# Patient Record
Sex: FEMALE | Race: WHITE | Employment: OTHER | URBAN - METROPOLITAN AREA
[De-identification: names, ages, dates, MRNs, and addresses within clinical notes are randomized per-mention and may not be internally consistent; named-entity substitution may affect disease eponyms.]

---

## 2017-02-13 ENCOUNTER — TELEPHONE (OUTPATIENT)
Dept: FAMILY MEDICINE CLINIC | Age: 77
End: 2017-02-13

## 2017-02-13 DIAGNOSIS — Z12.31 ENCOUNTER FOR SCREENING MAMMOGRAM FOR BREAST CANCER: Primary | ICD-10-CM

## 2017-02-13 RX ORDER — DILTIAZEM HYDROCHLORIDE 60 MG/1
TABLET, FILM COATED ORAL
Qty: 180 TABLET | Refills: 3 | Status: SHIPPED | OUTPATIENT
Start: 2017-02-13 | End: 2018-02-11 | Stop reason: SDUPTHER

## 2017-02-15 ENCOUNTER — HOSPITAL ENCOUNTER (OUTPATIENT)
Dept: WOMENS IMAGING | Age: 77
Discharge: HOME | End: 2017-02-15
Payer: MEDICARE

## 2017-02-15 DIAGNOSIS — Z12.31 ENCOUNTER FOR SCREENING MAMMOGRAM FOR BREAST CANCER: ICD-10-CM

## 2017-02-15 PROCEDURE — G0202 SCR MAMMO BI INCL CAD: HCPCS

## 2017-03-27 DIAGNOSIS — E78.00 PURE HYPERCHOLESTEROLEMIA: ICD-10-CM

## 2017-03-27 DIAGNOSIS — I10 ESSENTIAL HYPERTENSION: ICD-10-CM

## 2017-03-27 DIAGNOSIS — E11.21 TYPE 2 DIABETES MELLITUS WITH DIABETIC NEPHROPATHY, WITHOUT LONG-TERM CURRENT USE OF INSULIN (HCC): ICD-10-CM

## 2017-03-27 LAB
ALBUMIN SERPL-MCNC: 4.3 G/DL (ref 3.9–4.9)
ALP BLD-CCNC: 67 U/L (ref 40–130)
ALT SERPL-CCNC: 20 U/L (ref 0–33)
ANION GAP SERPL CALCULATED.3IONS-SCNC: 12 MEQ/L (ref 7–13)
AST SERPL-CCNC: 18 U/L (ref 0–35)
BASOPHILS ABSOLUTE: 0.1 K/UL (ref 0–0.2)
BASOPHILS RELATIVE PERCENT: 1.2 %
BILIRUB SERPL-MCNC: 0.5 MG/DL (ref 0–1.2)
BUN BLDV-MCNC: 21 MG/DL (ref 8–23)
CALCIUM SERPL-MCNC: 10.1 MG/DL (ref 8.6–10.2)
CHLORIDE BLD-SCNC: 97 MEQ/L (ref 98–107)
CHOLESTEROL, TOTAL: 123 MG/DL (ref 0–199)
CO2: 23 MEQ/L (ref 22–29)
CREAT SERPL-MCNC: 1.16 MG/DL (ref 0.5–0.9)
CREATININE URINE: 78.1 MG/DL
EOSINOPHILS ABSOLUTE: 0.2 K/UL (ref 0–0.7)
EOSINOPHILS RELATIVE PERCENT: 3.1 %
GFR AFRICAN AMERICAN: 55.1
GFR NON-AFRICAN AMERICAN: 45.5
GLOBULIN: 2.4 G/DL (ref 2.3–3.5)
GLUCOSE BLD-MCNC: 117 MG/DL (ref 74–109)
HBA1C MFR BLD: 6 % (ref 4.8–5.9)
HCT VFR BLD CALC: 30.5 % (ref 37–47)
HDLC SERPL-MCNC: 59 MG/DL (ref 40–59)
HEMOGLOBIN: 10.3 G/DL (ref 12–16)
LDL CHOLESTEROL CALCULATED: 40 MG/DL (ref 0–129)
LYMPHOCYTES ABSOLUTE: 1.4 K/UL (ref 1–4.8)
LYMPHOCYTES RELATIVE PERCENT: 17.2 %
MCH RBC QN AUTO: 29.4 PG (ref 27–31.3)
MCHC RBC AUTO-ENTMCNC: 33.8 % (ref 33–37)
MCV RBC AUTO: 86.9 FL (ref 82–100)
MICROALBUMIN UR-MCNC: 2.9 MG/DL
MICROALBUMIN/CREAT UR-RTO: 37.1 MG/G (ref 0–30)
MONOCYTES ABSOLUTE: 0.7 K/UL (ref 0.2–0.8)
MONOCYTES RELATIVE PERCENT: 9.3 %
NEUTROPHILS ABSOLUTE: 5.4 K/UL (ref 1.4–6.5)
NEUTROPHILS RELATIVE PERCENT: 69.2 %
PDW BLD-RTO: 14.7 % (ref 11.5–14.5)
PLATELET # BLD: 354 K/UL (ref 130–400)
POTASSIUM SERPL-SCNC: 4.8 MEQ/L (ref 3.5–5.1)
RBC # BLD: 3.51 M/UL (ref 4.2–5.4)
SODIUM BLD-SCNC: 132 MEQ/L (ref 132–144)
TOTAL PROTEIN: 6.7 G/DL (ref 6.4–8.1)
TRIGL SERPL-MCNC: 122 MG/DL (ref 0–200)
WBC # BLD: 7.9 K/UL (ref 4.8–10.8)

## 2017-04-03 ENCOUNTER — OFFICE VISIT (OUTPATIENT)
Dept: FAMILY MEDICINE CLINIC | Age: 77
End: 2017-04-03

## 2017-04-03 VITALS
SYSTOLIC BLOOD PRESSURE: 150 MMHG | RESPIRATION RATE: 12 BRPM | DIASTOLIC BLOOD PRESSURE: 60 MMHG | TEMPERATURE: 96.3 F | HEART RATE: 66 BPM | BODY MASS INDEX: 28.03 KG/M2 | HEIGHT: 64 IN | WEIGHT: 164.2 LBS

## 2017-04-03 DIAGNOSIS — I25.10 CORONARY ARTERY DISEASE INVOLVING NATIVE CORONARY ARTERY OF NATIVE HEART WITHOUT ANGINA PECTORIS: ICD-10-CM

## 2017-04-03 DIAGNOSIS — Z12.11 COLON CANCER SCREENING: ICD-10-CM

## 2017-04-03 DIAGNOSIS — I70.1 ATHEROSCLEROTIC RAS (RENAL ARTERY STENOSIS), BILATERAL (HCC): ICD-10-CM

## 2017-04-03 DIAGNOSIS — M54.50 CHRONIC BILATERAL LOW BACK PAIN WITHOUT SCIATICA: ICD-10-CM

## 2017-04-03 DIAGNOSIS — E11.21 TYPE 2 DIABETES MELLITUS WITH DIABETIC NEPHROPATHY, WITHOUT LONG-TERM CURRENT USE OF INSULIN (HCC): ICD-10-CM

## 2017-04-03 DIAGNOSIS — I10 ESSENTIAL HYPERTENSION: ICD-10-CM

## 2017-04-03 DIAGNOSIS — N28.9 RENAL INSUFFICIENCY: ICD-10-CM

## 2017-04-03 DIAGNOSIS — E11.21 TYPE 2 DIABETES MELLITUS WITH DIABETIC NEPHROPATHY, WITHOUT LONG-TERM CURRENT USE OF INSULIN (HCC): Primary | ICD-10-CM

## 2017-04-03 DIAGNOSIS — K63.5 COLON POLYPS: ICD-10-CM

## 2017-04-03 DIAGNOSIS — R35.0 URINARY FREQUENCY: ICD-10-CM

## 2017-04-03 DIAGNOSIS — G89.29 CHRONIC BILATERAL LOW BACK PAIN WITHOUT SCIATICA: ICD-10-CM

## 2017-04-03 DIAGNOSIS — E78.00 PURE HYPERCHOLESTEROLEMIA: ICD-10-CM

## 2017-04-03 LAB
ANION GAP SERPL CALCULATED.3IONS-SCNC: 12 MEQ/L (ref 7–13)
BUN BLDV-MCNC: 18 MG/DL (ref 8–23)
CALCIUM SERPL-MCNC: 9.7 MG/DL (ref 8.6–10.2)
CHLORIDE BLD-SCNC: 97 MEQ/L (ref 98–107)
CO2: 23 MEQ/L (ref 22–29)
CREAT SERPL-MCNC: 1.16 MG/DL (ref 0.5–0.9)
GFR AFRICAN AMERICAN: 55.1
GFR NON-AFRICAN AMERICAN: 45.5
GLUCOSE BLD-MCNC: 123 MG/DL (ref 74–109)
HBA1C MFR BLD: 6.1 % (ref 4.8–5.9)
POTASSIUM SERPL-SCNC: 4.4 MEQ/L (ref 3.5–5.1)
SODIUM BLD-SCNC: 132 MEQ/L (ref 132–144)

## 2017-04-03 PROCEDURE — G8427 DOCREV CUR MEDS BY ELIG CLIN: HCPCS | Performed by: FAMILY MEDICINE

## 2017-04-03 PROCEDURE — 1090F PRES/ABSN URINE INCON ASSESS: CPT | Performed by: FAMILY MEDICINE

## 2017-04-03 PROCEDURE — 4040F PNEUMOC VAC/ADMIN/RCVD: CPT | Performed by: FAMILY MEDICINE

## 2017-04-03 PROCEDURE — G8399 PT W/DXA RESULTS DOCUMENT: HCPCS | Performed by: FAMILY MEDICINE

## 2017-04-03 PROCEDURE — 3017F COLORECTAL CA SCREEN DOC REV: CPT | Performed by: FAMILY MEDICINE

## 2017-04-03 PROCEDURE — 1123F ACP DISCUSS/DSCN MKR DOCD: CPT | Performed by: FAMILY MEDICINE

## 2017-04-03 PROCEDURE — G8420 CALC BMI NORM PARAMETERS: HCPCS | Performed by: FAMILY MEDICINE

## 2017-04-03 PROCEDURE — 99214 OFFICE O/P EST MOD 30 MIN: CPT | Performed by: FAMILY MEDICINE

## 2017-04-03 PROCEDURE — G8598 ASA/ANTIPLAT THER USED: HCPCS | Performed by: FAMILY MEDICINE

## 2017-04-03 PROCEDURE — 1036F TOBACCO NON-USER: CPT | Performed by: FAMILY MEDICINE

## 2017-04-03 PROCEDURE — 3044F HG A1C LEVEL LT 7.0%: CPT | Performed by: FAMILY MEDICINE

## 2017-04-03 RX ORDER — HYDROCODONE BITARTRATE AND ACETAMINOPHEN 5; 325 MG/1; MG/1
TABLET ORAL
Qty: 120 TABLET | Refills: 0 | Status: SHIPPED | OUTPATIENT
Start: 2017-04-03 | End: 2017-07-11 | Stop reason: SDUPTHER

## 2017-04-03 ASSESSMENT — PATIENT HEALTH QUESTIONNAIRE - PHQ9
2. FEELING DOWN, DEPRESSED OR HOPELESS: 0
SUM OF ALL RESPONSES TO PHQ QUESTIONS 1-9: 0
SUM OF ALL RESPONSES TO PHQ9 QUESTIONS 1 & 2: 0
1. LITTLE INTEREST OR PLEASURE IN DOING THINGS: 0

## 2017-04-04 ENCOUNTER — TELEPHONE (OUTPATIENT)
Dept: FAMILY MEDICINE CLINIC | Age: 77
End: 2017-04-04

## 2017-04-14 ENCOUNTER — ANESTHESIA (OUTPATIENT)
Dept: ENDOSCOPY | Age: 77
End: 2017-04-14
Payer: MEDICARE

## 2017-04-14 ENCOUNTER — ANESTHESIA EVENT (OUTPATIENT)
Dept: ENDOSCOPY | Age: 77
End: 2017-04-14
Payer: MEDICARE

## 2017-04-14 ENCOUNTER — HOSPITAL ENCOUNTER (OUTPATIENT)
Age: 77
Setting detail: OUTPATIENT SURGERY
Discharge: HOME | End: 2017-04-14
Attending: SPECIALIST | Admitting: SPECIALIST
Payer: MEDICARE

## 2017-04-14 VITALS
HEIGHT: 64 IN | WEIGHT: 164 LBS | TEMPERATURE: 97.5 F | RESPIRATION RATE: 16 BRPM | BODY MASS INDEX: 28 KG/M2 | HEART RATE: 53 BPM | OXYGEN SATURATION: 98 % | DIASTOLIC BLOOD PRESSURE: 68 MMHG | SYSTOLIC BLOOD PRESSURE: 162 MMHG

## 2017-04-14 VITALS
RESPIRATION RATE: 19 BRPM | DIASTOLIC BLOOD PRESSURE: 65 MMHG | OXYGEN SATURATION: 100 % | SYSTOLIC BLOOD PRESSURE: 150 MMHG

## 2017-04-14 PROCEDURE — 3700000001 HC ADD 15 MINUTES (ANESTHESIA): Performed by: SPECIALIST

## 2017-04-14 PROCEDURE — 3700000000 HC ANESTHESIA ATTENDED CARE: Performed by: SPECIALIST

## 2017-04-14 PROCEDURE — 7100000011 HC PHASE II RECOVERY - ADDTL 15 MIN: Performed by: SPECIALIST

## 2017-04-14 PROCEDURE — 2580000003 HC RX 258: Performed by: STUDENT IN AN ORGANIZED HEALTH CARE EDUCATION/TRAINING PROGRAM

## 2017-04-14 PROCEDURE — 2500000003 HC RX 250 WO HCPCS: Performed by: STUDENT IN AN ORGANIZED HEALTH CARE EDUCATION/TRAINING PROGRAM

## 2017-04-14 PROCEDURE — 88305 TISSUE EXAM BY PATHOLOGIST: CPT

## 2017-04-14 PROCEDURE — 3609027000 HC COLONOSCOPY: Performed by: SPECIALIST

## 2017-04-14 PROCEDURE — 7100000010 HC PHASE II RECOVERY - FIRST 15 MIN: Performed by: SPECIALIST

## 2017-04-14 PROCEDURE — 6360000002 HC RX W HCPCS

## 2017-04-14 RX ORDER — SODIUM CHLORIDE 0.9 % (FLUSH) 0.9 %
10 SYRINGE (ML) INJECTION EVERY 12 HOURS SCHEDULED
Status: DISCONTINUED | OUTPATIENT
Start: 2017-04-14 | End: 2017-04-14 | Stop reason: HOSPADM

## 2017-04-14 RX ORDER — PROPOFOL 10 MG/ML
INJECTION, EMULSION INTRAVENOUS CONTINUOUS PRN
Status: DISCONTINUED | OUTPATIENT
Start: 2017-04-14 | End: 2017-04-14 | Stop reason: SDUPTHER

## 2017-04-14 RX ORDER — SODIUM CHLORIDE 9 MG/ML
INJECTION, SOLUTION INTRAVENOUS CONTINUOUS
Status: DISCONTINUED | OUTPATIENT
Start: 2017-04-14 | End: 2017-04-14 | Stop reason: HOSPADM

## 2017-04-14 RX ORDER — LIDOCAINE HYDROCHLORIDE 10 MG/ML
1 INJECTION, SOLUTION EPIDURAL; INFILTRATION; INTRACAUDAL; PERINEURAL
Status: COMPLETED | OUTPATIENT
Start: 2017-04-14 | End: 2017-04-14

## 2017-04-14 RX ORDER — SODIUM CHLORIDE 0.9 % (FLUSH) 0.9 %
10 SYRINGE (ML) INJECTION PRN
Status: DISCONTINUED | OUTPATIENT
Start: 2017-04-14 | End: 2017-04-14 | Stop reason: HOSPADM

## 2017-04-14 RX ADMIN — SODIUM CHLORIDE: 9 INJECTION, SOLUTION INTRAVENOUS at 10:40

## 2017-04-14 RX ADMIN — LIDOCAINE HYDROCHLORIDE 30 MG: 10 INJECTION, SOLUTION EPIDURAL; INFILTRATION; INTRACAUDAL; PERINEURAL at 10:45

## 2017-04-14 RX ADMIN — PROPOFOL 100 MCG/KG/MIN: 10 INJECTION, EMULSION INTRAVENOUS at 10:45

## 2017-04-14 ASSESSMENT — PAIN - FUNCTIONAL ASSESSMENT: PAIN_FUNCTIONAL_ASSESSMENT: 0-10

## 2017-04-20 RX ORDER — RAMIPRIL 10 MG/1
CAPSULE ORAL
Qty: 180 CAPSULE | Refills: 3 | Status: SHIPPED | OUTPATIENT
Start: 2017-04-20 | End: 2018-04-17 | Stop reason: SDUPTHER

## 2017-07-06 DIAGNOSIS — E11.21 TYPE 2 DIABETES MELLITUS WITH DIABETIC NEPHROPATHY, WITHOUT LONG-TERM CURRENT USE OF INSULIN (HCC): Primary | ICD-10-CM

## 2017-07-06 LAB — HBA1C MFR BLD: 6.3 % (ref 4.8–5.9)

## 2017-07-11 ENCOUNTER — OFFICE VISIT (OUTPATIENT)
Dept: FAMILY MEDICINE CLINIC | Age: 77
End: 2017-07-11

## 2017-07-11 VITALS
BODY MASS INDEX: 27.39 KG/M2 | HEART RATE: 66 BPM | RESPIRATION RATE: 12 BRPM | DIASTOLIC BLOOD PRESSURE: 80 MMHG | WEIGHT: 160.4 LBS | SYSTOLIC BLOOD PRESSURE: 110 MMHG | TEMPERATURE: 96.4 F | HEIGHT: 64 IN

## 2017-07-11 DIAGNOSIS — I10 ESSENTIAL HYPERTENSION: ICD-10-CM

## 2017-07-11 DIAGNOSIS — E11.21 TYPE 2 DIABETES MELLITUS WITH DIABETIC NEPHROPATHY, WITHOUT LONG-TERM CURRENT USE OF INSULIN (HCC): ICD-10-CM

## 2017-07-11 DIAGNOSIS — I25.10 CORONARY ARTERY DISEASE INVOLVING NATIVE CORONARY ARTERY OF NATIVE HEART WITHOUT ANGINA PECTORIS: ICD-10-CM

## 2017-07-11 DIAGNOSIS — E11.21 DIABETIC NEPHROPATHY ASSOCIATED WITH TYPE 2 DIABETES MELLITUS (HCC): ICD-10-CM

## 2017-07-11 DIAGNOSIS — E78.00 PURE HYPERCHOLESTEROLEMIA: ICD-10-CM

## 2017-07-11 PROCEDURE — 4040F PNEUMOC VAC/ADMIN/RCVD: CPT | Performed by: FAMILY MEDICINE

## 2017-07-11 PROCEDURE — 3017F COLORECTAL CA SCREEN DOC REV: CPT | Performed by: FAMILY MEDICINE

## 2017-07-11 PROCEDURE — 3046F HEMOGLOBIN A1C LEVEL >9.0%: CPT | Performed by: FAMILY MEDICINE

## 2017-07-11 PROCEDURE — G8427 DOCREV CUR MEDS BY ELIG CLIN: HCPCS | Performed by: FAMILY MEDICINE

## 2017-07-11 PROCEDURE — 1090F PRES/ABSN URINE INCON ASSESS: CPT | Performed by: FAMILY MEDICINE

## 2017-07-11 PROCEDURE — 1036F TOBACCO NON-USER: CPT | Performed by: FAMILY MEDICINE

## 2017-07-11 PROCEDURE — G8598 ASA/ANTIPLAT THER USED: HCPCS | Performed by: FAMILY MEDICINE

## 2017-07-11 PROCEDURE — 1123F ACP DISCUSS/DSCN MKR DOCD: CPT | Performed by: FAMILY MEDICINE

## 2017-07-11 PROCEDURE — 99214 OFFICE O/P EST MOD 30 MIN: CPT | Performed by: FAMILY MEDICINE

## 2017-07-11 PROCEDURE — G8419 CALC BMI OUT NRM PARAM NOF/U: HCPCS | Performed by: FAMILY MEDICINE

## 2017-07-11 PROCEDURE — G8399 PT W/DXA RESULTS DOCUMENT: HCPCS | Performed by: FAMILY MEDICINE

## 2017-07-11 RX ORDER — HYDROCODONE BITARTRATE AND ACETAMINOPHEN 5; 325 MG/1; MG/1
TABLET ORAL
Qty: 120 TABLET | Refills: 0 | Status: SHIPPED | OUTPATIENT
Start: 2017-07-11 | End: 2017-10-12 | Stop reason: SDUPTHER

## 2017-08-15 ENCOUNTER — HOSPITAL ENCOUNTER (OUTPATIENT)
Dept: ULTRASOUND IMAGING | Age: 77
Discharge: HOME | End: 2017-08-15
Payer: MEDICARE

## 2017-08-15 ENCOUNTER — OFFICE VISIT (OUTPATIENT)
Dept: FAMILY MEDICINE CLINIC | Age: 77
End: 2017-08-15

## 2017-08-15 VITALS
DIASTOLIC BLOOD PRESSURE: 72 MMHG | BODY MASS INDEX: 28.7 KG/M2 | WEIGHT: 168.1 LBS | RESPIRATION RATE: 25 BRPM | TEMPERATURE: 97.5 F | HEART RATE: 64 BPM | SYSTOLIC BLOOD PRESSURE: 126 MMHG | HEIGHT: 64 IN | OXYGEN SATURATION: 99 %

## 2017-08-15 DIAGNOSIS — R35.0 URINARY FREQUENCY: ICD-10-CM

## 2017-08-15 DIAGNOSIS — R39.11 URINARY HESITANCY: Primary | ICD-10-CM

## 2017-08-15 PROCEDURE — 1036F TOBACCO NON-USER: CPT | Performed by: NURSE PRACTITIONER

## 2017-08-15 PROCEDURE — 3017F COLORECTAL CA SCREEN DOC REV: CPT | Performed by: NURSE PRACTITIONER

## 2017-08-15 PROCEDURE — 1123F ACP DISCUSS/DSCN MKR DOCD: CPT | Performed by: NURSE PRACTITIONER

## 2017-08-15 PROCEDURE — G8598 ASA/ANTIPLAT THER USED: HCPCS | Performed by: NURSE PRACTITIONER

## 2017-08-15 PROCEDURE — 1090F PRES/ABSN URINE INCON ASSESS: CPT | Performed by: NURSE PRACTITIONER

## 2017-08-15 PROCEDURE — 99213 OFFICE O/P EST LOW 20 MIN: CPT | Performed by: NURSE PRACTITIONER

## 2017-08-15 PROCEDURE — 4040F PNEUMOC VAC/ADMIN/RCVD: CPT | Performed by: NURSE PRACTITIONER

## 2017-08-15 PROCEDURE — 76775 US EXAM ABDO BACK WALL LIM: CPT

## 2017-08-15 PROCEDURE — G8399 PT W/DXA RESULTS DOCUMENT: HCPCS | Performed by: NURSE PRACTITIONER

## 2017-08-15 PROCEDURE — 76770 US EXAM ABDO BACK WALL COMP: CPT

## 2017-08-15 PROCEDURE — G8419 CALC BMI OUT NRM PARAM NOF/U: HCPCS | Performed by: NURSE PRACTITIONER

## 2017-08-15 PROCEDURE — G8427 DOCREV CUR MEDS BY ELIG CLIN: HCPCS | Performed by: NURSE PRACTITIONER

## 2017-08-16 DIAGNOSIS — R39.11 URINARY HESITANCY: ICD-10-CM

## 2017-08-16 LAB
BACTERIA: ABNORMAL /HPF
BILIRUBIN URINE: NEGATIVE
BLOOD, URINE: ABNORMAL
CLARITY: ABNORMAL
COLOR: YELLOW
GLUCOSE URINE: NEGATIVE MG/DL
KETONES, URINE: NEGATIVE MG/DL
LEUKOCYTE ESTERASE, URINE: ABNORMAL
NITRITE, URINE: NEGATIVE
PH UA: 6.5 (ref 5–9)
PROTEIN UA: 100 MG/DL
RBC UA: ABNORMAL /HPF (ref 0–2)
SPECIFIC GRAVITY UA: 1.01 (ref 1–1.03)
URINE REFLEX TO CULTURE: YES
UROBILINOGEN, URINE: 0.2 E.U./DL
WBC UA: ABNORMAL /HPF (ref 0–5)

## 2017-08-18 DIAGNOSIS — N30.01 ACUTE CYSTITIS WITH HEMATURIA: Primary | ICD-10-CM

## 2017-08-18 LAB — URINE CULTURE, ROUTINE: NORMAL

## 2017-08-18 RX ORDER — CIPROFLOXACIN 500 MG/1
500 TABLET, FILM COATED ORAL 2 TIMES DAILY
Qty: 6 TABLET | Refills: 0 | Status: SHIPPED | OUTPATIENT
Start: 2017-08-18 | End: 2017-08-21

## 2017-10-05 DIAGNOSIS — I10 ESSENTIAL HYPERTENSION: ICD-10-CM

## 2017-10-05 DIAGNOSIS — E11.21 TYPE 2 DIABETES MELLITUS WITH DIABETIC NEPHROPATHY, WITHOUT LONG-TERM CURRENT USE OF INSULIN (HCC): ICD-10-CM

## 2017-10-05 LAB
ALBUMIN SERPL-MCNC: 4.5 G/DL (ref 3.9–4.9)
ALP BLD-CCNC: 42 U/L (ref 40–130)
ALT SERPL-CCNC: 15 U/L (ref 0–33)
ANION GAP SERPL CALCULATED.3IONS-SCNC: 16 MEQ/L (ref 7–13)
AST SERPL-CCNC: 22 U/L (ref 0–35)
BILIRUB SERPL-MCNC: 0.4 MG/DL (ref 0–1.2)
BUN BLDV-MCNC: 20 MG/DL (ref 8–23)
CALCIUM SERPL-MCNC: 9.3 MG/DL (ref 8.6–10.2)
CHLORIDE BLD-SCNC: 93 MEQ/L (ref 98–107)
CO2: 24 MEQ/L (ref 22–29)
CREAT SERPL-MCNC: 1.15 MG/DL (ref 0.5–0.9)
GFR AFRICAN AMERICAN: 55.6
GFR NON-AFRICAN AMERICAN: 45.9
GLOBULIN: 2.4 G/DL (ref 2.3–3.5)
GLUCOSE BLD-MCNC: 113 MG/DL (ref 74–109)
HBA1C MFR BLD: 6.1 % (ref 4.8–5.9)
POTASSIUM SERPL-SCNC: 4.8 MEQ/L (ref 3.5–5.1)
SODIUM BLD-SCNC: 133 MEQ/L (ref 132–144)
TOTAL PROTEIN: 6.9 G/DL (ref 6.4–8.1)

## 2017-10-12 ENCOUNTER — OFFICE VISIT (OUTPATIENT)
Dept: FAMILY MEDICINE CLINIC | Age: 77
End: 2017-10-12

## 2017-10-12 VITALS
HEART RATE: 56 BPM | OXYGEN SATURATION: 98 % | WEIGHT: 165.2 LBS | TEMPERATURE: 96.9 F | SYSTOLIC BLOOD PRESSURE: 130 MMHG | DIASTOLIC BLOOD PRESSURE: 52 MMHG | HEIGHT: 64 IN | BODY MASS INDEX: 28.2 KG/M2

## 2017-10-12 DIAGNOSIS — E11.21 TYPE 2 DIABETES MELLITUS WITH DIABETIC NEPHROPATHY, WITHOUT LONG-TERM CURRENT USE OF INSULIN (HCC): Primary | ICD-10-CM

## 2017-10-12 DIAGNOSIS — I25.10 CORONARY ARTERY DISEASE INVOLVING NATIVE CORONARY ARTERY OF NATIVE HEART WITHOUT ANGINA PECTORIS: ICD-10-CM

## 2017-10-12 DIAGNOSIS — E78.00 PURE HYPERCHOLESTEROLEMIA: ICD-10-CM

## 2017-10-12 DIAGNOSIS — E11.21 DIABETIC NEPHROPATHY ASSOCIATED WITH TYPE 2 DIABETES MELLITUS (HCC): ICD-10-CM

## 2017-10-12 DIAGNOSIS — Z23 NEEDS FLU SHOT: ICD-10-CM

## 2017-10-12 DIAGNOSIS — I10 ESSENTIAL HYPERTENSION: ICD-10-CM

## 2017-10-12 PROCEDURE — 99214 OFFICE O/P EST MOD 30 MIN: CPT | Performed by: FAMILY MEDICINE

## 2017-10-12 PROCEDURE — G8417 CALC BMI ABV UP PARAM F/U: HCPCS | Performed by: FAMILY MEDICINE

## 2017-10-12 PROCEDURE — 3046F HEMOGLOBIN A1C LEVEL >9.0%: CPT | Performed by: FAMILY MEDICINE

## 2017-10-12 PROCEDURE — 1123F ACP DISCUSS/DSCN MKR DOCD: CPT | Performed by: FAMILY MEDICINE

## 2017-10-12 PROCEDURE — 90662 IIV NO PRSV INCREASED AG IM: CPT | Performed by: FAMILY MEDICINE

## 2017-10-12 PROCEDURE — 1036F TOBACCO NON-USER: CPT | Performed by: FAMILY MEDICINE

## 2017-10-12 PROCEDURE — 1090F PRES/ABSN URINE INCON ASSESS: CPT | Performed by: FAMILY MEDICINE

## 2017-10-12 PROCEDURE — G8484 FLU IMMUNIZE NO ADMIN: HCPCS | Performed by: FAMILY MEDICINE

## 2017-10-12 PROCEDURE — 3017F COLORECTAL CA SCREEN DOC REV: CPT | Performed by: FAMILY MEDICINE

## 2017-10-12 PROCEDURE — G8427 DOCREV CUR MEDS BY ELIG CLIN: HCPCS | Performed by: FAMILY MEDICINE

## 2017-10-12 PROCEDURE — 4040F PNEUMOC VAC/ADMIN/RCVD: CPT | Performed by: FAMILY MEDICINE

## 2017-10-12 PROCEDURE — G8598 ASA/ANTIPLAT THER USED: HCPCS | Performed by: FAMILY MEDICINE

## 2017-10-12 PROCEDURE — G0008 ADMIN INFLUENZA VIRUS VAC: HCPCS | Performed by: FAMILY MEDICINE

## 2017-10-12 PROCEDURE — G8399 PT W/DXA RESULTS DOCUMENT: HCPCS | Performed by: FAMILY MEDICINE

## 2017-10-12 RX ORDER — HYDROCODONE BITARTRATE AND ACETAMINOPHEN 5; 325 MG/1; MG/1
TABLET ORAL
Qty: 120 TABLET | Refills: 0 | Status: SHIPPED | OUTPATIENT
Start: 2017-10-12 | End: 2018-04-12 | Stop reason: SDUPTHER

## 2017-10-12 NOTE — PROGRESS NOTES
with diabetic nephropathy, without long-term current use of insulin ([facility] [address]     2. Essential hypertension     3. Pure hypercholesterolemia     4. Coronary artery disease involving native coronary artery of native heart without angina pectoris     5. Diabetic nephropathy associated with type 2 diabetes mellitus ([facility] [address]     6. Needs flu shot  INFLUENZA, HIGH DOSE, 65 YRS +, IM, PF, PREFILL SYR, 0.5ML (FLUZONE HD)       PLAN:  Orders Placed This Encounter   Procedures    INFLUENZA, HIGH DOSE, 65 YRS +, IM, PF, PREFILL SYR, 0.5ML (FLUZONE HD)     Orders Placed This Encounter   Medications    HYDROcodone-acetaminophen (NORCO) 5-325 MG per tablet     Sig: TAKE ONE TABLET EVERY 4-6 HOURS AS NEEDED. Dispense:  120 tablet     Refill:  0       Controlled Substances Monitoring:   Attestation: The Prescription Monitoring Report for this patient was reviewed today. Nino Houston MD)  Documentation: No signs of potential drug abuse or diversion identified. Nino Houston MD)  Follow-up in 3 months or as needed. Patient will need labs prior to their next visit. Follow up with Dr. Sharlon Bamberger as scheduled.

## 2018-02-11 NOTE — TELEPHONE ENCOUNTER
Pharmacy requesting refill    Medication pended. Last Ov: 10/12/17  Last Rx: 11/19/17    Please approve or deny.     Future Appointments  Date Time Provider Figueroa Campuzano   4/5/2018 9:15 AM SCHEDULE, LAB VINAY Leon PCP [facility]   4/12/2018 10:15 AM Dheeraj Guerrero MD [address] [facility]

## 2018-02-12 RX ORDER — DILTIAZEM HYDROCHLORIDE 60 MG/1
TABLET, FILM COATED ORAL
Qty: 180 TABLET | Refills: 3 | Status: SHIPPED | OUTPATIENT
Start: 2018-02-12 | End: 2019-02-18 | Stop reason: SDUPTHER

## 2018-03-21 DIAGNOSIS — Z12.31 ENCOUNTER FOR SCREENING MAMMOGRAM FOR BREAST CANCER: Primary | ICD-10-CM

## 2018-03-24 ENCOUNTER — HOSPITAL ENCOUNTER (OUTPATIENT)
Dept: WOMENS IMAGING | Age: 78
Discharge: HOME | End: 2018-03-26
Payer: MEDICARE

## 2018-03-24 DIAGNOSIS — Z12.31 ENCOUNTER FOR SCREENING MAMMOGRAM FOR BREAST CANCER: ICD-10-CM

## 2018-03-24 PROCEDURE — 77067 SCR MAMMO BI INCL CAD: CPT

## 2018-04-05 DIAGNOSIS — E78.00 PURE HYPERCHOLESTEROLEMIA: ICD-10-CM

## 2018-04-05 DIAGNOSIS — E11.21 TYPE 2 DIABETES MELLITUS WITH DIABETIC NEPHROPATHY, WITHOUT LONG-TERM CURRENT USE OF INSULIN (HCC): ICD-10-CM

## 2018-04-05 DIAGNOSIS — I10 ESSENTIAL HYPERTENSION: ICD-10-CM

## 2018-04-05 LAB
ALBUMIN SERPL-MCNC: 4.3 G/DL (ref 3.9–4.9)
ALP BLD-CCNC: 46 U/L (ref 40–130)
ALT SERPL-CCNC: 20 U/L (ref 0–33)
ANION GAP SERPL CALCULATED.3IONS-SCNC: 17 MEQ/L (ref 7–13)
AST SERPL-CCNC: 20 U/L (ref 0–35)
BASOPHILS ABSOLUTE: 0.1 K/UL (ref 0–0.2)
BASOPHILS RELATIVE PERCENT: 1.2 %
BILIRUB SERPL-MCNC: 0.3 MG/DL (ref 0–1.2)
BUN BLDV-MCNC: 13 MG/DL (ref 8–23)
CALCIUM SERPL-MCNC: 9.6 MG/DL (ref 8.6–10.2)
CHLORIDE BLD-SCNC: 99 MEQ/L (ref 98–107)
CHOLESTEROL, TOTAL: 106 MG/DL (ref 0–199)
CO2: 23 MEQ/L (ref 22–29)
CREAT SERPL-MCNC: 1.27 MG/DL (ref 0.5–0.9)
EOSINOPHILS ABSOLUTE: 0.3 K/UL (ref 0–0.7)
EOSINOPHILS RELATIVE PERCENT: 3.5 %
GFR AFRICAN AMERICAN: 49.5
GFR NON-AFRICAN AMERICAN: 40.9
GLOBULIN: 2.1 G/DL (ref 2.3–3.5)
GLUCOSE BLD-MCNC: 110 MG/DL (ref 74–109)
HBA1C MFR BLD: 6.4 % (ref 4.8–5.9)
HCT VFR BLD CALC: 26.5 % (ref 37–47)
HDLC SERPL-MCNC: 48 MG/DL (ref 40–59)
HEMOGLOBIN: 8.8 G/DL (ref 12–16)
LDL CHOLESTEROL CALCULATED: 36 MG/DL (ref 0–129)
LYMPHOCYTES ABSOLUTE: 1.5 K/UL (ref 1–4.8)
LYMPHOCYTES RELATIVE PERCENT: 21 %
MCH RBC QN AUTO: 27.7 PG (ref 27–31.3)
MCHC RBC AUTO-ENTMCNC: 33.3 % (ref 33–37)
MCV RBC AUTO: 83.2 FL (ref 82–100)
MONOCYTES ABSOLUTE: 0.7 K/UL (ref 0.2–0.8)
MONOCYTES RELATIVE PERCENT: 9.1 %
NEUTROPHILS ABSOLUTE: 4.7 K/UL (ref 1.4–6.5)
NEUTROPHILS RELATIVE PERCENT: 65.2 %
PDW BLD-RTO: 15.3 % (ref 11.5–14.5)
PLATELET # BLD: 365 K/UL (ref 130–400)
POTASSIUM SERPL-SCNC: 5 MEQ/L (ref 3.5–5.1)
RBC # BLD: 3.18 M/UL (ref 4.2–5.4)
SODIUM BLD-SCNC: 139 MEQ/L (ref 132–144)
TOTAL PROTEIN: 6.4 G/DL (ref 6.4–8.1)
TRIGL SERPL-MCNC: 111 MG/DL (ref 0–200)
WBC # BLD: 7.3 K/UL (ref 4.8–10.8)

## 2018-04-06 LAB
CREATININE URINE: 94 MG/DL
MICROALBUMIN UR-MCNC: 10.6 MG/DL
MICROALBUMIN/CREAT UR-RTO: 112.8 MG/G (ref 0–30)

## 2018-04-12 ENCOUNTER — OFFICE VISIT (OUTPATIENT)
Dept: FAMILY MEDICINE CLINIC | Age: 78
End: 2018-04-12
Payer: MEDICARE

## 2018-04-12 VITALS
BODY MASS INDEX: 28.48 KG/M2 | HEIGHT: 64 IN | DIASTOLIC BLOOD PRESSURE: 52 MMHG | HEART RATE: 57 BPM | SYSTOLIC BLOOD PRESSURE: 124 MMHG | OXYGEN SATURATION: 99 % | TEMPERATURE: 97.5 F | WEIGHT: 166.8 LBS

## 2018-04-12 DIAGNOSIS — I10 ESSENTIAL HYPERTENSION: ICD-10-CM

## 2018-04-12 DIAGNOSIS — N18.30 CONTROLLED TYPE 2 DIABETES MELLITUS WITH STAGE 3 CHRONIC KIDNEY DISEASE, WITHOUT LONG-TERM CURRENT USE OF INSULIN (HCC): Primary | ICD-10-CM

## 2018-04-12 DIAGNOSIS — D50.9 MICROCYTIC ANEMIA: ICD-10-CM

## 2018-04-12 DIAGNOSIS — E11.21 TYPE 2 DIABETES MELLITUS WITH DIABETIC NEPHROPATHY, WITHOUT LONG-TERM CURRENT USE OF INSULIN (HCC): ICD-10-CM

## 2018-04-12 DIAGNOSIS — M17.0 PRIMARY OSTEOARTHRITIS OF BOTH KNEES: ICD-10-CM

## 2018-04-12 DIAGNOSIS — G89.29 CHRONIC BILATERAL LOW BACK PAIN WITHOUT SCIATICA: ICD-10-CM

## 2018-04-12 DIAGNOSIS — E11.22 CONTROLLED TYPE 2 DIABETES MELLITUS WITH STAGE 3 CHRONIC KIDNEY DISEASE, WITHOUT LONG-TERM CURRENT USE OF INSULIN (HCC): Primary | ICD-10-CM

## 2018-04-12 DIAGNOSIS — M54.50 CHRONIC BILATERAL LOW BACK PAIN WITHOUT SCIATICA: ICD-10-CM

## 2018-04-12 DIAGNOSIS — I25.10 CORONARY ARTERY DISEASE INVOLVING NATIVE CORONARY ARTERY OF NATIVE HEART WITHOUT ANGINA PECTORIS: ICD-10-CM

## 2018-04-12 DIAGNOSIS — I70.1 ATHEROSCLEROTIC RAS (RENAL ARTERY STENOSIS), BILATERAL (HCC): ICD-10-CM

## 2018-04-12 DIAGNOSIS — R09.89 LEFT CAROTID BRUIT: ICD-10-CM

## 2018-04-12 DIAGNOSIS — E11.21 TYPE 2 DIABETES MELLITUS WITH DIABETIC NEPHROPATHY, WITHOUT LONG-TERM CURRENT USE OF INSULIN (HCC): Primary | ICD-10-CM

## 2018-04-12 DIAGNOSIS — E78.00 PURE HYPERCHOLESTEROLEMIA: ICD-10-CM

## 2018-04-12 LAB
BASOPHILS ABSOLUTE: 0.1 K/UL (ref 0–0.2)
BASOPHILS RELATIVE PERCENT: 1.2 %
EOSINOPHILS ABSOLUTE: 0.3 K/UL (ref 0–0.7)
EOSINOPHILS RELATIVE PERCENT: 4.5 %
FERRITIN: 8.3 NG/ML (ref 13–150)
FOLATE: 15.4 NG/ML (ref 7.3–26.1)
HCT VFR BLD CALC: 29.1 % (ref 37–47)
HEMOGLOBIN: 9.4 G/DL (ref 12–16)
IRON SATURATION: 9 % (ref 11–46)
IRON: 40 UG/DL (ref 37–145)
LYMPHOCYTES ABSOLUTE: 1.3 K/UL (ref 1–4.8)
LYMPHOCYTES RELATIVE PERCENT: 18.6 %
MCH RBC QN AUTO: 26.9 PG (ref 27–31.3)
MCHC RBC AUTO-ENTMCNC: 32.4 % (ref 33–37)
MCV RBC AUTO: 83 FL (ref 82–100)
MONOCYTES ABSOLUTE: 0.6 K/UL (ref 0.2–0.8)
MONOCYTES RELATIVE PERCENT: 8.5 %
NEUTROPHILS ABSOLUTE: 4.8 K/UL (ref 1.4–6.5)
NEUTROPHILS RELATIVE PERCENT: 67.2 %
PDW BLD-RTO: 15.1 % (ref 11.5–14.5)
PLATELET # BLD: 367 K/UL (ref 130–400)
RBC # BLD: 3.5 M/UL (ref 4.2–5.4)
RETICULOCYTE ABSOLUTE COUNT: 0.05 M/CUMM (ref 0.02–0.11)
RETICULOCYTE COUNT PCT: 1.4 % (ref 0.6–2.2)
TOTAL IRON BINDING CAPACITY: 424 UG/DL (ref 178–450)
VITAMIN B-12: 193 PG/ML (ref 232–1245)
WBC # BLD: 7.2 K/UL (ref 4.8–10.8)

## 2018-04-12 PROCEDURE — G8399 PT W/DXA RESULTS DOCUMENT: HCPCS | Performed by: FAMILY MEDICINE

## 2018-04-12 PROCEDURE — G8427 DOCREV CUR MEDS BY ELIG CLIN: HCPCS | Performed by: FAMILY MEDICINE

## 2018-04-12 PROCEDURE — 4040F PNEUMOC VAC/ADMIN/RCVD: CPT | Performed by: FAMILY MEDICINE

## 2018-04-12 PROCEDURE — 1036F TOBACCO NON-USER: CPT | Performed by: FAMILY MEDICINE

## 2018-04-12 PROCEDURE — G8417 CALC BMI ABV UP PARAM F/U: HCPCS | Performed by: FAMILY MEDICINE

## 2018-04-12 PROCEDURE — 99214 OFFICE O/P EST MOD 30 MIN: CPT | Performed by: FAMILY MEDICINE

## 2018-04-12 PROCEDURE — 1090F PRES/ABSN URINE INCON ASSESS: CPT | Performed by: FAMILY MEDICINE

## 2018-04-12 PROCEDURE — G8598 ASA/ANTIPLAT THER USED: HCPCS | Performed by: FAMILY MEDICINE

## 2018-04-12 PROCEDURE — 1123F ACP DISCUSS/DSCN MKR DOCD: CPT | Performed by: FAMILY MEDICINE

## 2018-04-12 RX ORDER — HYDROCODONE BITARTRATE AND ACETAMINOPHEN 5; 325 MG/1; MG/1
1 TABLET ORAL EVERY 6 HOURS PRN
Qty: 28 TABLET | Refills: 0 | Status: SHIPPED | OUTPATIENT
Start: 2018-04-12 | End: 2018-10-12 | Stop reason: SDUPTHER

## 2018-04-12 ASSESSMENT — PATIENT HEALTH QUESTIONNAIRE - PHQ9
1. LITTLE INTEREST OR PLEASURE IN DOING THINGS: 0
SUM OF ALL RESPONSES TO PHQ QUESTIONS 1-9: 0
2. FEELING DOWN, DEPRESSED OR HOPELESS: 0
SUM OF ALL RESPONSES TO PHQ9 QUESTIONS 1 & 2: 0

## 2018-04-14 LAB — TRANSFERRIN: 396 MG/DL (ref 200–400)

## 2018-04-15 DIAGNOSIS — D50.8 IRON DEFICIENCY ANEMIA SECONDARY TO INADEQUATE DIETARY IRON INTAKE: ICD-10-CM

## 2018-04-15 DIAGNOSIS — D51.3 OTHER DIETARY VITAMIN B12 DEFICIENCY ANEMIA: ICD-10-CM

## 2018-04-15 PROBLEM — D50.9 IRON DEFICIENCY ANEMIA: Status: ACTIVE | Noted: 2018-04-15

## 2018-04-15 RX ORDER — LANOLIN ALCOHOL/MO/W.PET/CERES
325 CREAM (GRAM) TOPICAL 2 TIMES DAILY WITH MEALS
Qty: 60 TABLET | Refills: 2 | Status: SHIPPED | OUTPATIENT
Start: 2018-04-15 | End: 2021-06-21

## 2018-04-17 RX ORDER — RAMIPRIL 10 MG/1
CAPSULE ORAL
Qty: 180 CAPSULE | Refills: 3 | Status: SHIPPED | OUTPATIENT
Start: 2018-04-17 | End: 2019-03-28 | Stop reason: SDUPTHER

## 2018-04-18 ENCOUNTER — NURSE ONLY (OUTPATIENT)
Dept: FAMILY MEDICINE CLINIC | Age: 78
End: 2018-04-18
Payer: MEDICARE

## 2018-04-18 DIAGNOSIS — E53.8 B12 DEFICIENCY: Primary | ICD-10-CM

## 2018-04-18 PROCEDURE — 96372 THER/PROPH/DIAG INJ SC/IM: CPT | Performed by: FAMILY MEDICINE

## 2018-04-18 RX ORDER — CYANOCOBALAMIN 1000 UG/ML
1000 INJECTION INTRAMUSCULAR; SUBCUTANEOUS ONCE
Status: COMPLETED | OUTPATIENT
Start: 2018-04-18 | End: 2018-04-18

## 2018-04-18 RX ADMIN — CYANOCOBALAMIN 1000 MCG: 1000 INJECTION INTRAMUSCULAR; SUBCUTANEOUS at 10:53

## 2018-04-20 ENCOUNTER — HOSPITAL ENCOUNTER (OUTPATIENT)
Dept: ULTRASOUND IMAGING | Age: 78
Discharge: HOME | End: 2018-04-22
Payer: MEDICARE

## 2018-04-20 DIAGNOSIS — R09.89 LEFT CAROTID BRUIT: ICD-10-CM

## 2018-04-20 PROCEDURE — 93880 EXTRACRANIAL BILAT STUDY: CPT

## 2018-04-22 PROBLEM — I65.23 ASYMPTOMATIC CAROTID ARTERY STENOSIS, BILATERAL: Status: ACTIVE | Noted: 2018-04-22

## 2018-04-25 ENCOUNTER — NURSE ONLY (OUTPATIENT)
Dept: FAMILY MEDICINE CLINIC | Age: 78
End: 2018-04-25
Payer: MEDICARE

## 2018-04-25 DIAGNOSIS — E53.8 B12 DEFICIENCY: Primary | ICD-10-CM

## 2018-04-25 DIAGNOSIS — D50.8 IRON DEFICIENCY ANEMIA SECONDARY TO INADEQUATE DIETARY IRON INTAKE: ICD-10-CM

## 2018-04-25 LAB
CONTROL: NORMAL
HEMOCCULT STL QL: POSITIVE

## 2018-04-25 PROCEDURE — 96372 THER/PROPH/DIAG INJ SC/IM: CPT | Performed by: FAMILY MEDICINE

## 2018-04-25 PROCEDURE — G0328 FECAL BLOOD SCRN IMMUNOASSAY: HCPCS | Performed by: FAMILY MEDICINE

## 2018-04-25 RX ORDER — CYANOCOBALAMIN 1000 UG/ML
1000 INJECTION INTRAMUSCULAR; SUBCUTANEOUS ONCE
Status: COMPLETED | OUTPATIENT
Start: 2018-04-25 | End: 2018-04-25

## 2018-04-25 RX ADMIN — CYANOCOBALAMIN 1000 MCG: 1000 INJECTION INTRAMUSCULAR; SUBCUTANEOUS at 09:22

## 2018-04-28 DIAGNOSIS — R19.5 POSITIVE FIT (FECAL IMMUNOCHEMICAL TEST): Primary | ICD-10-CM

## 2018-04-28 DIAGNOSIS — D50.9 IRON DEFICIENCY ANEMIA, UNSPECIFIED IRON DEFICIENCY ANEMIA TYPE: ICD-10-CM

## 2018-04-28 DIAGNOSIS — K63.5 POLYP OF COLON, UNSPECIFIED PART OF COLON, UNSPECIFIED TYPE: ICD-10-CM

## 2018-05-09 ENCOUNTER — NURSE ONLY (OUTPATIENT)
Dept: FAMILY MEDICINE CLINIC | Age: 78
End: 2018-05-09
Payer: MEDICARE

## 2018-05-09 DIAGNOSIS — E53.8 B12 DEFICIENCY: Primary | ICD-10-CM

## 2018-05-09 PROCEDURE — 96372 THER/PROPH/DIAG INJ SC/IM: CPT | Performed by: FAMILY MEDICINE

## 2018-05-09 RX ORDER — CYANOCOBALAMIN 1000 UG/ML
1000 INJECTION INTRAMUSCULAR; SUBCUTANEOUS ONCE
Status: COMPLETED | OUTPATIENT
Start: 2018-05-09 | End: 2018-05-09

## 2018-05-09 RX ADMIN — CYANOCOBALAMIN 1000 MCG: 1000 INJECTION INTRAMUSCULAR; SUBCUTANEOUS at 09:03

## 2018-05-23 ENCOUNTER — NURSE ONLY (OUTPATIENT)
Dept: FAMILY MEDICINE CLINIC | Age: 78
End: 2018-05-23
Payer: MEDICARE

## 2018-05-23 DIAGNOSIS — E53.8 B12 DEFICIENCY: Primary | ICD-10-CM

## 2018-05-23 PROCEDURE — 96372 THER/PROPH/DIAG INJ SC/IM: CPT | Performed by: FAMILY MEDICINE

## 2018-05-23 RX ORDER — CYANOCOBALAMIN 1000 UG/ML
1000 INJECTION INTRAMUSCULAR; SUBCUTANEOUS ONCE
Status: COMPLETED | OUTPATIENT
Start: 2018-05-23 | End: 2018-05-23

## 2018-05-23 RX ADMIN — CYANOCOBALAMIN 1000 MCG: 1000 INJECTION INTRAMUSCULAR; SUBCUTANEOUS at 09:08

## 2018-06-06 ENCOUNTER — HOSPITAL ENCOUNTER (OUTPATIENT)
Dept: ULTRASOUND IMAGING | Age: 78
Discharge: HOME | End: 2018-06-08
Payer: MEDICARE

## 2018-06-06 DIAGNOSIS — N18.9 CHRONIC KIDNEY DISEASE, UNSPECIFIED CKD STAGE: ICD-10-CM

## 2018-06-06 DIAGNOSIS — Z78.0 POSTMENOPAUSAL: Primary | ICD-10-CM

## 2018-06-06 PROCEDURE — 76775 US EXAM ABDO BACK WALL LIM: CPT

## 2018-06-07 ENCOUNTER — HOSPITAL ENCOUNTER (OUTPATIENT)
Dept: GENERAL RADIOLOGY | Age: 78
Discharge: HOME | End: 2018-06-09
Payer: MEDICARE

## 2018-06-07 DIAGNOSIS — Z78.0 POSTMENOPAUSAL: ICD-10-CM

## 2018-06-07 PROCEDURE — 77080 DXA BONE DENSITY AXIAL: CPT

## 2018-06-20 ENCOUNTER — NURSE ONLY (OUTPATIENT)
Dept: FAMILY MEDICINE CLINIC | Age: 78
End: 2018-06-20
Payer: MEDICARE

## 2018-06-20 DIAGNOSIS — E53.8 B12 DEFICIENCY: Primary | ICD-10-CM

## 2018-06-20 PROCEDURE — 96372 THER/PROPH/DIAG INJ SC/IM: CPT | Performed by: FAMILY MEDICINE

## 2018-06-20 RX ORDER — CYANOCOBALAMIN 1000 UG/ML
1000 INJECTION INTRAMUSCULAR; SUBCUTANEOUS ONCE
Status: COMPLETED | OUTPATIENT
Start: 2018-06-20 | End: 2018-06-20

## 2018-06-20 RX ADMIN — CYANOCOBALAMIN 1000 MCG: 1000 INJECTION INTRAMUSCULAR; SUBCUTANEOUS at 09:36

## 2018-07-18 ENCOUNTER — NURSE ONLY (OUTPATIENT)
Dept: FAMILY MEDICINE CLINIC | Age: 78
End: 2018-07-18
Payer: MEDICARE

## 2018-07-18 DIAGNOSIS — E53.8 B12 DEFICIENCY: Primary | ICD-10-CM

## 2018-07-18 PROCEDURE — 96372 THER/PROPH/DIAG INJ SC/IM: CPT | Performed by: FAMILY MEDICINE

## 2018-07-18 RX ORDER — CYANOCOBALAMIN 1000 UG/ML
1000 INJECTION INTRAMUSCULAR; SUBCUTANEOUS ONCE
Status: COMPLETED | OUTPATIENT
Start: 2018-07-18 | End: 2018-07-18

## 2018-07-18 RX ADMIN — CYANOCOBALAMIN 1000 MCG: 1000 INJECTION INTRAMUSCULAR; SUBCUTANEOUS at 11:30

## 2018-08-15 ENCOUNTER — NURSE ONLY (OUTPATIENT)
Dept: FAMILY MEDICINE CLINIC | Age: 78
End: 2018-08-15
Payer: MEDICARE

## 2018-08-15 DIAGNOSIS — D51.3 OTHER DIETARY VITAMIN B12 DEFICIENCY ANEMIA: Primary | ICD-10-CM

## 2018-08-15 PROCEDURE — 96372 THER/PROPH/DIAG INJ SC/IM: CPT | Performed by: FAMILY MEDICINE

## 2018-08-15 RX ORDER — CYANOCOBALAMIN 1000 UG/ML
1000 INJECTION INTRAMUSCULAR; SUBCUTANEOUS ONCE
Status: COMPLETED | OUTPATIENT
Start: 2018-08-15 | End: 2018-08-15

## 2018-08-15 RX ADMIN — CYANOCOBALAMIN 1000 MCG: 1000 INJECTION INTRAMUSCULAR; SUBCUTANEOUS at 10:11

## 2018-09-17 ENCOUNTER — NURSE ONLY (OUTPATIENT)
Dept: FAMILY MEDICINE CLINIC | Age: 78
End: 2018-09-17
Payer: MEDICARE

## 2018-09-17 DIAGNOSIS — E53.8 B12 DEFICIENCY: Primary | ICD-10-CM

## 2018-09-17 PROCEDURE — 96372 THER/PROPH/DIAG INJ SC/IM: CPT | Performed by: FAMILY MEDICINE

## 2018-09-17 RX ORDER — CYANOCOBALAMIN 1000 UG/ML
1000 INJECTION INTRAMUSCULAR; SUBCUTANEOUS ONCE
Status: COMPLETED | OUTPATIENT
Start: 2018-09-17 | End: 2018-09-17

## 2018-09-17 RX ADMIN — CYANOCOBALAMIN 1000 MCG: 1000 INJECTION INTRAMUSCULAR; SUBCUTANEOUS at 09:59

## 2018-10-09 DIAGNOSIS — E11.22 CONTROLLED TYPE 2 DIABETES MELLITUS WITH STAGE 3 CHRONIC KIDNEY DISEASE, WITHOUT LONG-TERM CURRENT USE OF INSULIN (HCC): ICD-10-CM

## 2018-10-09 DIAGNOSIS — E11.22 CONTROLLED TYPE 2 DIABETES MELLITUS WITH STAGE 3 CHRONIC KIDNEY DISEASE, WITHOUT LONG-TERM CURRENT USE OF INSULIN (HCC): Primary | ICD-10-CM

## 2018-10-09 DIAGNOSIS — N18.30 CONTROLLED TYPE 2 DIABETES MELLITUS WITH STAGE 3 CHRONIC KIDNEY DISEASE, WITHOUT LONG-TERM CURRENT USE OF INSULIN (HCC): ICD-10-CM

## 2018-10-09 DIAGNOSIS — I10 ESSENTIAL HYPERTENSION: ICD-10-CM

## 2018-10-09 DIAGNOSIS — D50.9 IRON DEFICIENCY ANEMIA, UNSPECIFIED IRON DEFICIENCY ANEMIA TYPE: ICD-10-CM

## 2018-10-09 DIAGNOSIS — N18.30 CONTROLLED TYPE 2 DIABETES MELLITUS WITH STAGE 3 CHRONIC KIDNEY DISEASE, WITHOUT LONG-TERM CURRENT USE OF INSULIN (HCC): Primary | ICD-10-CM

## 2018-10-09 LAB
ALBUMIN SERPL-MCNC: 4.5 G/DL (ref 3.9–4.9)
ALP BLD-CCNC: 61 U/L (ref 40–130)
ALT SERPL-CCNC: 19 U/L (ref 0–33)
ANION GAP SERPL CALCULATED.3IONS-SCNC: 15 MEQ/L (ref 7–13)
AST SERPL-CCNC: 23 U/L (ref 0–35)
BASOPHILS ABSOLUTE: 0.1 K/UL (ref 0–0.2)
BASOPHILS RELATIVE PERCENT: 1.2 %
BILIRUB SERPL-MCNC: 0.3 MG/DL (ref 0–1.2)
BUN BLDV-MCNC: 21 MG/DL (ref 8–23)
CALCIUM SERPL-MCNC: 9.5 MG/DL (ref 8.6–10.2)
CHLORIDE BLD-SCNC: 95 MEQ/L (ref 98–107)
CO2: 24 MEQ/L (ref 22–29)
CREAT SERPL-MCNC: 1.42 MG/DL (ref 0.5–0.9)
EOSINOPHILS ABSOLUTE: 0.5 K/UL (ref 0–0.7)
EOSINOPHILS RELATIVE PERCENT: 6.2 %
GFR AFRICAN AMERICAN: 43.4
GFR NON-AFRICAN AMERICAN: 35.9
GLOBULIN: 2.4 G/DL (ref 2.3–3.5)
GLUCOSE BLD-MCNC: 113 MG/DL (ref 74–109)
HBA1C MFR BLD: 6.7 % (ref 4.8–5.9)
HCT VFR BLD CALC: 32 % (ref 37–47)
HEMOGLOBIN: 11.2 G/DL (ref 12–16)
LYMPHOCYTES ABSOLUTE: 1.3 K/UL (ref 1–4.8)
LYMPHOCYTES RELATIVE PERCENT: 17.3 %
MCH RBC QN AUTO: 32.4 PG (ref 27–31.3)
MCHC RBC AUTO-ENTMCNC: 35 % (ref 33–37)
MCV RBC AUTO: 92.5 FL (ref 82–100)
MONOCYTES ABSOLUTE: 0.8 K/UL (ref 0.2–0.8)
MONOCYTES RELATIVE PERCENT: 10.4 %
NEUTROPHILS ABSOLUTE: 4.8 K/UL (ref 1.4–6.5)
NEUTROPHILS RELATIVE PERCENT: 64.9 %
PDW BLD-RTO: 13.7 % (ref 11.5–14.5)
PLATELET # BLD: 343 K/UL (ref 130–400)
POTASSIUM SERPL-SCNC: 5.1 MEQ/L (ref 3.5–5.1)
RBC # BLD: 3.46 M/UL (ref 4.2–5.4)
SODIUM BLD-SCNC: 134 MEQ/L (ref 132–144)
TOTAL PROTEIN: 6.9 G/DL (ref 6.4–8.1)
WBC # BLD: 7.4 K/UL (ref 4.8–10.8)

## 2018-10-11 ENCOUNTER — OFFICE VISIT (OUTPATIENT)
Dept: FAMILY MEDICINE CLINIC | Age: 78
End: 2018-10-11
Payer: MEDICARE

## 2018-10-11 VITALS
RESPIRATION RATE: 16 BRPM | SYSTOLIC BLOOD PRESSURE: 138 MMHG | HEART RATE: 68 BPM | BODY MASS INDEX: 28.17 KG/M2 | DIASTOLIC BLOOD PRESSURE: 54 MMHG | WEIGHT: 165 LBS | TEMPERATURE: 97.1 F | HEIGHT: 64 IN

## 2018-10-11 DIAGNOSIS — I10 ESSENTIAL HYPERTENSION: ICD-10-CM

## 2018-10-11 DIAGNOSIS — Z13.220 SCREENING, LIPID: ICD-10-CM

## 2018-10-11 DIAGNOSIS — N18.30 CONTROLLED TYPE 2 DIABETES MELLITUS WITH STAGE 3 CHRONIC KIDNEY DISEASE, WITHOUT LONG-TERM CURRENT USE OF INSULIN (HCC): Primary | ICD-10-CM

## 2018-10-11 DIAGNOSIS — E11.22 CONTROLLED TYPE 2 DIABETES MELLITUS WITH STAGE 3 CHRONIC KIDNEY DISEASE, WITHOUT LONG-TERM CURRENT USE OF INSULIN (HCC): Primary | ICD-10-CM

## 2018-10-11 DIAGNOSIS — Z23 NEEDS FLU SHOT: ICD-10-CM

## 2018-10-11 DIAGNOSIS — E11.21 DIABETIC NEPHROPATHY ASSOCIATED WITH TYPE 2 DIABETES MELLITUS (HCC): ICD-10-CM

## 2018-10-11 DIAGNOSIS — Z23 NEED FOR PNEUMOCOCCAL VACCINATION: ICD-10-CM

## 2018-10-11 DIAGNOSIS — N18.30 CHRONIC KIDNEY DISEASE, STAGE III (MODERATE) (HCC): ICD-10-CM

## 2018-10-11 PROCEDURE — 1101F PT FALLS ASSESS-DOCD LE1/YR: CPT | Performed by: FAMILY MEDICINE

## 2018-10-11 PROCEDURE — G8427 DOCREV CUR MEDS BY ELIG CLIN: HCPCS | Performed by: FAMILY MEDICINE

## 2018-10-11 PROCEDURE — 99214 OFFICE O/P EST MOD 30 MIN: CPT | Performed by: FAMILY MEDICINE

## 2018-10-11 PROCEDURE — 1090F PRES/ABSN URINE INCON ASSESS: CPT | Performed by: FAMILY MEDICINE

## 2018-10-11 PROCEDURE — 90662 IIV NO PRSV INCREASED AG IM: CPT | Performed by: FAMILY MEDICINE

## 2018-10-11 PROCEDURE — G8482 FLU IMMUNIZE ORDER/ADMIN: HCPCS | Performed by: FAMILY MEDICINE

## 2018-10-11 PROCEDURE — 4040F PNEUMOC VAC/ADMIN/RCVD: CPT | Performed by: FAMILY MEDICINE

## 2018-10-11 PROCEDURE — 1036F TOBACCO NON-USER: CPT | Performed by: FAMILY MEDICINE

## 2018-10-11 PROCEDURE — G8399 PT W/DXA RESULTS DOCUMENT: HCPCS | Performed by: FAMILY MEDICINE

## 2018-10-11 PROCEDURE — 90732 PPSV23 VACC 2 YRS+ SUBQ/IM: CPT | Performed by: FAMILY MEDICINE

## 2018-10-11 PROCEDURE — G8417 CALC BMI ABV UP PARAM F/U: HCPCS | Performed by: FAMILY MEDICINE

## 2018-10-11 PROCEDURE — G8598 ASA/ANTIPLAT THER USED: HCPCS | Performed by: FAMILY MEDICINE

## 2018-10-11 PROCEDURE — G0009 ADMIN PNEUMOCOCCAL VACCINE: HCPCS | Performed by: FAMILY MEDICINE

## 2018-10-11 PROCEDURE — G0008 ADMIN INFLUENZA VIRUS VAC: HCPCS | Performed by: FAMILY MEDICINE

## 2018-10-11 PROCEDURE — 1123F ACP DISCUSS/DSCN MKR DOCD: CPT | Performed by: FAMILY MEDICINE

## 2018-10-11 RX ORDER — BLOOD-GLUCOSE METER
1 EACH MISCELLANEOUS 2 TIMES DAILY
Qty: 1 KIT | Refills: 0 | Status: SHIPPED | OUTPATIENT
Start: 2018-10-11 | End: 2021-08-20 | Stop reason: ALTCHOICE

## 2018-10-11 ASSESSMENT — ENCOUNTER SYMPTOMS
SINUS PAIN: 0
VOMITING: 0
SORE THROAT: 0
ABDOMINAL PAIN: 0
CONSTIPATION: 0
VOICE CHANGE: 0
BLOOD IN STOOL: 0
DIARRHEA: 0
WHEEZING: 0
RHINORRHEA: 0
SHORTNESS OF BREATH: 0
NAUSEA: 0
COUGH: 0
CHEST TIGHTNESS: 0
COLOR CHANGE: 0
TROUBLE SWALLOWING: 0

## 2018-10-11 NOTE — PROGRESS NOTES
bilateral ([facility] [address]     R 80%, L 60%    CAD (coronary artery disease)     Diabetes mellitus, type 2 ([facility] [address]     Diabetic nephropathy ([facility] [address]     Glaucoma     Hemorrhoids     Hyperlipidemia     Hypertension     OA (osteoarthritis)     Obesity      Past Surgical History:   Procedure Laterality Date    CATARACT REMOVAL      LEFT    COLONOSCOPY  6/23/10    DR Abdullahi La    COLONOSCOPY  08/30/2013    DR. SALMON    CORONARY ANGIOPLASTY  2009    CORONARY ARTERY BYPASS GRAFT      DENTAL SURGERY      [place] COLON CA SCRN NOT  [address] [facility] N/A 4/14/2017    COLONOSCOPY performed by Yue Blank MD at [address] [place] [address]     family history includes Diabetes in her brother; Heart Disease in her mother; Lupus in her sister. Social History     Social History    Marital status:      Spouse name: N/A    Number of children: N/A    Years of education: N/A     Occupational History    Not on file. Social History Main Topics    Smoking status: Former Smoker     Packs/day: 0.50     Years: 30.00     Types: Cigarettes     Quit date: 2/22/2000    Smokeless tobacco: Never Used      Comment: quit 17 years ago    Alcohol use No    Drug use: No    Sexual activity: Not on file     Other Topics Concern    Not on file     Social History Narrative    No narrative on file       Current Outpatient Prescriptions on File Prior to Visit   Medication Sig Dispense Refill    metFORMIN (GLUCOPHAGE) 1000 MG tablet TAKE 1 TABLET BY MOUTH TWICE DAILY WITH MEALS 180 tablet 3    ramipril (ALTACE) 10 MG capsule TAKE 1 CAPSULE BY MOUTH TWICE DAILY 180 capsule 3    diltiazem (CARDIZEM) 60 MG tablet TAKE 1 TABLET BY MOUTH TWICE DAILY 180 tablet 3    carvedilol (COREG) 25 MG tablet TAKE 1 TABLET BY MOUTH TWICE DAILY  3    NITROSTAT 0.4 MG SL tablet   3    atorvastatin (LIPITOR) 40 MG tablet Take 40 mg by mouth daily.  hydrochlorothiazide (HYDRODIURIL) 25 MG tablet Take 25 mg by mouth daily. PLAN:  Orders Placed This Encounter   Procedures    INFLUENZA, HIGH DOSE, 65 YRS +, IM, PF, PREFILL SYR, 0.5ML (FLUZONE HD)    Pneumococcal polysaccharide vaccine 23-valent greater than or equal to 1yo subcutaneous/IM     [id number]    CBC Auto Differential     Standing Status:   Future     Standing Expiration Date:   10/11/2019    Comprehensive Metabolic Panel     Standing Status:   Future     Standing Expiration Date:   10/11/2019    Hemoglobin A1C     Standing Status:   Future     Standing Expiration Date:   10/11/2019    Lipid Panel     Standing Status:   Future     Standing Expiration Date:   10/11/2019     Order Specific Question:   Is Patient Fasting?/# of Hours     Answer:   10-12    Microalbumin / Creatinine Urine Ratio     Standing Status:   Future     Standing Expiration Date:   10/11/2019     Orders Placed This Encounter   Medications    Blood Glucose Monitoring Suppl ([place]) w/Device KIT     Si Device by Does not apply route 2 times daily     Dispense:  1 kit     Refill:  0    blood glucose test strips (ONETOUCH VERIO) strip     Si each by In Vitro route daily As needed. Dispense:  100 each     Refill:  3    ONETOUCH DELICA LANCETS FINE MISC     Si box by Does not apply route 2 times daily     Dispense:  1 each     Refill:  3     Quality & Risk Score Accuracy    Visit Dx:  N18.3 - Chronic kidney disease, stage III (moderate) ([facility])  Assessment and plan:  Stable based upon symptoms and exam. Continue current treatment plan and follow up at least yearly. Last edited 10/11/18 22:24 EDT by Juan Luis Martinez MD         Patient was given a prescription for a new glucometer with test strips and lancets. Patient was encouraged to follow-up with Dr. Irma Lauren for follow-up of her chronic kidney disease. Follow-up in 3 months or as needed. Patient will need fasting labs prior to their next visit. Follow up with Dr. Chacho Claros as scheduled.

## 2018-10-12 DIAGNOSIS — M54.50 CHRONIC BILATERAL LOW BACK PAIN WITHOUT SCIATICA: ICD-10-CM

## 2018-10-12 DIAGNOSIS — M17.0 PRIMARY OSTEOARTHRITIS OF BOTH KNEES: ICD-10-CM

## 2018-10-12 DIAGNOSIS — G89.29 CHRONIC BILATERAL LOW BACK PAIN WITHOUT SCIATICA: ICD-10-CM

## 2018-10-12 RX ORDER — HYDROCODONE BITARTRATE AND ACETAMINOPHEN 5; 325 MG/1; MG/1
1 TABLET ORAL EVERY 6 HOURS PRN
Qty: 28 TABLET | Refills: 0 | Status: SHIPPED | OUTPATIENT
Start: 2018-10-12 | End: 2018-11-09

## 2018-10-12 NOTE — TELEPHONE ENCOUNTER
Progress Notes by Manju Fortune MD at 04/23/18 10:32 AM     Author:  Manju Fortune MD Service:  (none) Author Type:  Physician     Filed:  04/23/18 11:21 AM Encounter Date:  2018 Status:  Signed     :  Manju Fortune MD (Physician)              2 Week Well Child Visit  Roomed by: MACY Pearl 10:32 AM     Accompanied by:[EJ1.1T] Mother and Grandmother ok to leave message [phone] Tessie[EJ1.1M]   DEVELOPMENTAL LANDMARKS  Smiles when asleep?[EJ1.1T] Yes[EJ1.1M]  Turns and lifts head while on stomach?[EJ1.1T] Yes[EJ1.1M]  Bears weight?[EJ1.1T] Yes[EJ1.1M]  Moves all extremities equally?[EJ1.1T] Yes[EJ1.1M]  Regards face?[EJ1.1T] Yes[EJ1.1M]  Total number of \"No\" responses to developmental landmark questions:[EJ1.1T] 0[EJ1.1M]    Hearing screen?[EJ1.1T] Passed[EJ1.1M]    SUBJECTIVE:   Present health:[EJ1.1T] Good[EJ1.1M]  Diet:[EJ1.1T] Enfamil - 2-2.5 ounces - every 2-3 hours[AS1.1M].  Elimination:      BM’s:[EJ1.1T] Normal[AS1.1M]  Urine:[EJ1.1T] Normal[AS1.1M]  Sleep:[EJ1.1T] Normal[AS1.1M]   Sleeps on :[EJ1.1T] Back[AS1.1M]    ROS  All other systems reviewed were negative    Family history:[EJ1.1T] No change in family history[AS1.1M]    Social history:[EJ1.1T] Not in  or school[AS1.1M]     OBJECTIVE:  Physical exam[EJ1.1T]   Pulse 168  Temp 98.3 °F (36.8 °C) (Temporal)   Resp 54  Ht 1' 7.24\" (0.489 m)  Wt 6 lb 9.4 oz (2.988 kg)  HC 14\" (35.6 cm)  BMI 12.51 kg/m2[EJ1.2T]    GENERAL: Evaluation of appearance.  Findings:[EJ1.1T] Normal- alert and no distress noted[AS1.1T]    HEAD & SCALP: Evaluation for lesions, swelling, tenderness and abnormalities.  Findings:[EJ1.1T] Normal - normocephalic with no lesions present[AS1.1T]     [place]:[EJ1.1T] Anterior [place] - open[AS1.1M]    EYES: Evaluation for redness, swelling, drainage and abnormalities.  Findings:[EJ1.1T] Both eyes normal - red reflex present, no redness or drainage, HEIDI[AS1.1T]    EARS: Evaluation of   patient requesting refill. Please approve or deny refill request. Medication pended. Thank you.     Last OV: 10/11/2018  Last RX: 04/12/18    Next Visit Date:  Future Appointments  Date Time Provider Figueroa Campuzano   4/4/2019 9:00 AM SCHEDULE, LAB VINAY Dickinson PCP [address] [place] [address]   4/11/2019 9:30 AM Bernard Gonzalez MD [facility] [facility] external ears, canals, and tm's  Findings:[EJ1.1T] Normal bilateral ext. ears, canals, and tm's[AS1.1T]    NOSE: Evaluation for swelling and drainage  Findings:[EJ1.1T] Normal - patent with no swelling or discharge present[AS1.1T]    MOUTH: Evaluation of tongue and mucosal membranes  Findings:[EJ1.1T] Normal[AS1.1T]    THROAT: Evaluation for redness and lesions  Findings:[EJ1.1T] Normal[AS1.1T]    NECK: Evaluation for masses, swelling and soreness  Findings:[EJ1.1T] Supple, no adenopathy or masses[AS1.1T]    CHEST: Evaluation - auscultation and observation  Findings:[EJ1.1T] Normal - clear to auscultation, breath sounds normal, no rhonchi - wheezes - or rales[AS1.1T]. BREAST:[EJ1.1T] Normal[AS1.1T]    CARDIO: Evaluation by auscultation   Findings:[EJ1.1T] Normal - RRR, normal S1&S2, no murmurs or extra sounds noted[AS1.1T]    ABDOMEN: Evaluation for bowel sounds, organomegaly, masses and tenderness  Findings:[EJ1.1T] Normal - soft, no tenderness, no masses, no organomegaly[AS1.1T]         Umbilicus:[EJ1.1T] Cord absent.  No redness or drainage.[AS1.1M]    : Evaluation by inspections.  Findings:[EJ1.1T] normal male - testes descended bilaterally and circumcised[AS1.1M]    MUS-SKEL: Evaluation for swelling, edema, range of motion or other abnormalities.  Findings:[EJ1.1T] Normal, no scoliosis or other abnormalities[AS1.1T].  Hip Exam:[EJ1.1T] Normal[AS1.1T]    SKIN: Evaluation for rashes, acne and lesions  Findings:[EJ1.1T] Normal[AS1.1T]    NEURO: Evaluation by observation.  Findings:[EJ1.1T] Normal[AS1.1T]      ASSESSMENT:[EJ1.1T]   Normal Health Maintenance Visit[AS1.1T]    PLAN:  Immunizations given today?[EJ1.1T] No.[AS1.1M]  See Orders  Reviewed Discussion and Guidance Topics:[EJ1.1T] recommended Tdap booster for parents, Accident Prevention: falls/high places, car seat, safe toys, no walkers, hot water, Good Sleep Habits - sleep position, normal crying, cuddling won't spoil baby, range of normal bowel habits,  getting out without baby, reviewed current Vitamin D recommendations, diet and colds/illness[AS1.1M]    Parent to use baby book.[EJ1.1T]    They will get hip U/S at 6 weeks due to breech presentation at birth.[AS1.1M]    Make Appointment for 2 Month Well Child Checkup  Call if questions or problems.[EJ1.1T]    Electronically Signed by:    Manju Fortune MD , 2018[AS1.2T]            Revision History        User Key Date/Time User Provider Type Action    > AS1.2 04/23/18 11:21 AM Manju Fortune MD Physician Sign     AS1.1 04/23/18 11:20 AM Manju Fortune MD Physician      EJ1.2 04/23/18 10:37 AM Esperanza Noguera NCMA Certified Medical Assistant Sign at close encounter     EJ1.1 04/23/18 10:32 AM Esperanza Noguera NCMA Certified Medical Assistant     M - Manual, T - Template

## 2018-10-23 LAB — DIABETIC RETINOPATHY: NEGATIVE

## 2019-02-12 ENCOUNTER — TELEPHONE (OUTPATIENT)
Dept: FAMILY MEDICINE CLINIC | Age: 79
End: 2019-02-12

## 2019-03-28 RX ORDER — RAMIPRIL 10 MG/1
CAPSULE ORAL
Qty: 180 CAPSULE | Refills: 3 | Status: SHIPPED | OUTPATIENT
Start: 2019-03-28 | End: 2021-04-06

## 2019-04-04 DIAGNOSIS — I10 ESSENTIAL HYPERTENSION: ICD-10-CM

## 2019-04-04 DIAGNOSIS — N18.30 CONTROLLED TYPE 2 DIABETES MELLITUS WITH STAGE 3 CHRONIC KIDNEY DISEASE, WITHOUT LONG-TERM CURRENT USE OF INSULIN (HCC): ICD-10-CM

## 2019-04-04 DIAGNOSIS — Z13.220 SCREENING, LIPID: ICD-10-CM

## 2019-04-04 DIAGNOSIS — E11.22 CONTROLLED TYPE 2 DIABETES MELLITUS WITH STAGE 3 CHRONIC KIDNEY DISEASE, WITHOUT LONG-TERM CURRENT USE OF INSULIN (HCC): ICD-10-CM

## 2019-04-04 LAB
ALBUMIN SERPL-MCNC: 4.4 G/DL (ref 3.5–4.6)
ALP BLD-CCNC: 72 U/L (ref 40–130)
ALT SERPL-CCNC: 19 U/L (ref 0–33)
ANION GAP SERPL CALCULATED.3IONS-SCNC: 17 MEQ/L (ref 9–15)
AST SERPL-CCNC: 25 U/L (ref 0–35)
BASOPHILS ABSOLUTE: 0.1 K/UL (ref 0–0.2)
BASOPHILS RELATIVE PERCENT: 1.5 %
BILIRUB SERPL-MCNC: 0.3 MG/DL (ref 0.2–0.7)
BUN BLDV-MCNC: 18 MG/DL (ref 8–23)
CALCIUM SERPL-MCNC: 9.4 MG/DL (ref 8.5–9.9)
CHLORIDE BLD-SCNC: 97 MEQ/L (ref 95–107)
CHOLESTEROL, TOTAL: 116 MG/DL (ref 0–199)
CO2: 23 MEQ/L (ref 20–31)
CREAT SERPL-MCNC: 1.39 MG/DL (ref 0.5–0.9)
EOSINOPHILS ABSOLUTE: 0.4 K/UL (ref 0–0.7)
EOSINOPHILS RELATIVE PERCENT: 5.7 %
GFR AFRICAN AMERICAN: 44.5
GFR NON-AFRICAN AMERICAN: 36.8
GLOBULIN: 2.7 G/DL (ref 2.3–3.5)
GLUCOSE BLD-MCNC: 117 MG/DL (ref 70–99)
HBA1C MFR BLD: 5.8 % (ref 4.8–5.9)
HCT VFR BLD CALC: 31.6 % (ref 37–47)
HDLC SERPL-MCNC: 51 MG/DL (ref 40–59)
HEMOGLOBIN: 10.8 G/DL (ref 12–16)
LDL CHOLESTEROL CALCULATED: 41 MG/DL (ref 0–129)
LYMPHOCYTES ABSOLUTE: 1.4 K/UL (ref 1–4.8)
LYMPHOCYTES RELATIVE PERCENT: 19.3 %
MCH RBC QN AUTO: 31.6 PG (ref 27–31.3)
MCHC RBC AUTO-ENTMCNC: 34.2 % (ref 33–37)
MCV RBC AUTO: 92.6 FL (ref 82–100)
MONOCYTES ABSOLUTE: 0.7 K/UL (ref 0.2–0.8)
MONOCYTES RELATIVE PERCENT: 9.7 %
NEUTROPHILS ABSOLUTE: 4.7 K/UL (ref 1.4–6.5)
NEUTROPHILS RELATIVE PERCENT: 63.8 %
PDW BLD-RTO: 13 % (ref 11.5–14.5)
PLATELET # BLD: 376 K/UL (ref 130–400)
POTASSIUM SERPL-SCNC: 4.8 MEQ/L (ref 3.4–4.9)
RBC # BLD: 3.41 M/UL (ref 4.2–5.4)
SODIUM BLD-SCNC: 137 MEQ/L (ref 135–144)
TOTAL PROTEIN: 7.1 G/DL (ref 6.3–8)
TRIGL SERPL-MCNC: 122 MG/DL (ref 0–150)
WBC # BLD: 7.4 K/UL (ref 4.8–10.8)

## 2019-04-05 DIAGNOSIS — N17.9 STAGE 3 ACUTE KIDNEY INJURY (HCC): Primary | ICD-10-CM

## 2019-05-21 RX ORDER — DILTIAZEM HYDROCHLORIDE 60 MG/1
TABLET, FILM COATED ORAL
Qty: 180 TABLET | Refills: 0 | Status: SHIPPED | OUTPATIENT
Start: 2019-05-21 | End: 2021-04-06

## 2019-05-21 NOTE — TELEPHONE ENCOUNTER
Pharmacy is requesting medication refill. Please approve or deny this request.    Rx requested:  Requested Prescriptions     Pending Prescriptions Disp Refills    diltiazem (CARDIZEM) 60 MG tablet [Pharmacy Med Name: DILTIAZEM HCL 60 MG TABLET] 180 tablet 0     Sig: TAKE 1 TABLET TWICE DAILY         Last Office Visit:   Visit date not found      Next Visit Date:  No future appointments.

## 2020-12-22 LAB — SURGICAL PATHOLOGY REPORT: NORMAL

## 2020-12-29 LAB — SURGICAL PATHOLOGY REPORT: NORMAL

## 2021-03-25 ENCOUNTER — TELEPHONE (OUTPATIENT)
Dept: PRIMARY CARE CLINIC | Age: 81
End: 2021-03-25

## 2021-03-25 NOTE — TELEPHONE ENCOUNTER
Kiarra Guadalupe calling just to advise Dr Nae Panchal  that the patient is wanting to drive and the family is hoping to get a doctor order at next ov to help assist with this. They do not want this to be mentioned to the patient due to her Dementia.  Please call kiarra Guadalupe with any questions

## 2021-03-30 ENCOUNTER — TELEPHONE (OUTPATIENT)
Dept: FAMILY MEDICINE CLINIC | Age: 81
End: 2021-03-30

## 2021-03-30 NOTE — TELEPHONE ENCOUNTER
1. Appt time and date. (give directions)     2. Arrive 15 min before appt. 3. Please bring all medications to appt. 4. Bring immunization record.   (if no record, which immunizations have you had and where?)            Future Appointments   Date Time Provider Figueroa Campuzano   4/6/2021 12:40 PM Evelina Reyes, [address] [place] [address]     Left detailed message on [employer]

## 2021-03-31 ENCOUNTER — TELEPHONE (OUTPATIENT)
Dept: FAMILY MEDICINE CLINIC | Age: 81
End: 2021-03-31

## 2021-03-31 NOTE — TELEPHONE ENCOUNTER
Spoke with Nicolasa at [facility], informed labs will be ordered at the time of appointment due to pt never being seen in the office.

## 2021-04-05 RX ORDER — HYDROCODONE BITARTRATE AND ACETAMINOPHEN 5; 325 MG/1; MG/1
TABLET ORAL
COMMUNITY
End: 2021-08-31 | Stop reason: SDUPTHER

## 2021-04-05 NOTE — PROGRESS NOTES
Chief Complaint   Patient presents with    New Patient     establish care    Diabetes    Dementia    Coronary Artery Disease    Hypertension    Chronic Kidney Disease    Anemia    Carotid Disease    Back Pain     Chronic       History obtained from daughter and the patient. SUBJECTIVE:  Shagufta Mcfarlane is a 80 y.o. female that presents today for establishing care with new physician, etc. New patient, 1st time visit to [facility] @ [facility]. -DM2: On amaryl  a1c today 5.1  occ lows  Previous a1c's low as well  Was on metformin prior to worsening renal fxn      -Dementia:  Dx 6 months ago  On aricept 5mg daily  Tolerating well  Not driving      -CAD:  No hx of MI or CHF  On asa, BB and statin  1V CABG 20+ years ago  Needs to EST with local cardio  No SI/HI      -HTN/CKD4/Hyponatremia:  BP <140/90  Stage 4 CKD, unclear etiology, as it progressed rapidly in the last year or so  Seeing nephro in [place]  Records pending  Complicated by anemia  Needs to EST with nephro  Also has some mild, chronic hyponatremia      -Anemia:  Had some ?  Bowel bleeding in DEC  Neg EGD/[place] then other than polyps  No bleeding since  On iron  No bleeding, melena or hematochezia      -Carotid artery stenosis:  Mild  Noted on US 2018  Was to have repeat in a year  On statin and asa  No stroke like sxs      -Chronic back pain:  On norco prn  Doesn't use often  Could get 30 at a time  Last was DEC 2020  Doesn't need RF      Age/Gender Health Maintenance    Lipid -   Lab Results   Component Value Date    CHOL 119 01/04/2021    CHOL 116 04/04/2019    CHOL 106 04/05/2018     Lab Results   Component Value Date    TRIG 111 01/04/2021    TRIG 122 04/04/2019    TRIG 111 04/05/2018     Lab Results   Component Value Date    HDL 62 (H) 01/04/2021    HDL 51 04/04/2019    HDL 48 04/05/2018     Lab Results   Component Value Date    LDLCALC 35 01/04/2021    LDLCALC 41 04/04/2019    LDLCALC 36 04/05/2018       Colon Cancer Screening - 3 polyps APR 2017 and again in [facility] [address] 2020, 2 [place] adenomas and 2 hyperplastic polyps  Lung Cancer Screening (Age 54 to [de-identified] with 30 pack year hx, current smoker or quit within past 15 years) - not a candidate    Tetanus - UTD MARCH 2015  Influenza Vaccine - Candidate FALL 2021  Pneumonia Vaccine - UTD x 2  Zoster - to get at pharmacy per medicare rules     Breast Cancer Screening - NEG MARCH 2018  Cervical Cancer Screening - aged out  Osteoporosis Screening - WNL June 2018      Diabetes Health Maintenance    A1C -   Lab Results   Component Value Date    LABA1C 4.9 01/18/2021    LABA1C 4.9 01/04/2021    LABA1C 5.8 04/04/2019    LABA1C 6.7 10/09/2018    LABA1C 5.8 05/17/2018    LABA1C 6.4 04/05/2018     ACE/ARB - no, severe CKD  Eye - records pending  Foot - WNL APR 2021  ASA - yes  Microal/Cr -   Lab Results   Component Value Date    LABMICR 7.40 (H) 01/08/2021    LABCREA 80.8 01/08/2021     Lab Results   Component Value Date    MICROALBUR 0.9 06/17/2011    MALBCR 91.6 (H) 01/08/2021       eGFR -   No results found for: GFRESTIMATE  Lab Results   Component Value Date    LABGLOM 15.3 03/01/2021     No results found for: CRCLEARANCE  No results found for: EGFRNONAA  No results found for: EGFRAA    Statin - yes, lipitor       Specialist List:  Nephro:  Cardio:      Current Outpatient Medications   Medication Sig Dispense Refill    acetaminophen (TYLENOL) 325 MG tablet Take 650 mg by mouth every 4 hours as needed for Pain or Fever      amLODIPine (NORVASC) 10 MG tablet Take 10 mg by mouth daily      loperamide (IMODIUM) 2 MG capsule Take 2 mg by mouth 2 times daily as needed for Diarrhea      docusate sodium (COLACE) 100 MG capsule Take 100 mg by mouth 2 times daily as needed for Constipation      donepezil (ARICEPT) 5 MG tablet Take 5 mg by mouth nightly      guaiFENesin (ROBITUSSIN) 100 MG/5ML SOLN oral solution Take 100 mg by mouth every 4 hours as needed for Cough      hydrALAZINE (APRESOLINE) 100 MG tablet Take 100 mg by mouth 3 times daily      hydrOXYzine (ATARAX) 25 MG tablet Take 25 mg by mouth every 6 hours as needed for Anxiety      bismuth subsalicylate (PEPTO BISMOL) 262 MG/15ML suspension 30 mLs 30 ML PO prn with each loose BM. **Not to exceed 6 doses in 24hours**      Cholecalciferol (VITAMIN D3) 1.25 MG (00831 UT) CAPS Take 1 capsule by mouth once a week      HYDROcodone-acetaminophen (NORCO) 5-325 MG per tablet 1 tab(s) orally every 12 hours, As Needed for pain      Blood Glucose Monitoring Suppl (ONETOUCH VERIO) w/Device KIT 1 Device by Does not apply route 2 times daily 1 kit 0    blood glucose test strips (ONETOUCH VERIO) strip 1 each by In Vitro route daily As needed. 100 each 3    ONETOUCH DELICA LANCETS FINE MISC 1 box by Does not apply route 2 times daily 1 each 3    carvedilol (COREG) 25 MG tablet TAKE 1 TABLET BY MOUTH TWICE DAILY  3    NITROSTAT 0.4 MG SL tablet   3    atorvastatin (LIPITOR) 40 MG tablet Take 40 mg by mouth daily.  fish oil-omega-3 fatty acids 1000 MG capsule Take 3 g by mouth daily.  aspirin 81 MG tablet Take 81 mg by mouth daily.  ferrous sulfate (FE TABS) 325 (65 Fe) MG EC tablet Take 1 tablet by mouth 2 times daily (with meals) 60 tablet 2     No current facility-administered medications for this visit. No orders of the defined types were placed in this encounter. All medications reviewed and reconciled, including OTC and herbal medications. Updated list given to patient.        Patient Active Problem List    Diagnosis Date Noted    Fracture of upper end of humerus 04/06/2021    Anemia due to stage 4 chronic kidney disease ([facility] 04/06/2021    ASHD (arteriosclerotic heart disease)     CKD (chronic kidney disease) stage 4, GFR 15-29 ml/min ([facility])     Dementia ([facility]     DM2 (diabetes mellitus, type 2) ([facility])     Dyslipidemia     Hemorrhoids     Hypertension, essential     Osteoarthritis     Asymptomatic carotid artery stenosis, bilateral 2018    Iron deficiency anemia 04/15/2018    B12 deficiency     Colon polyps 2017    Chronic bilateral low back pain without sciatica 2017    Glaucoma     Atherosclerotic AMRIT (renal artery stenosis), bilateral (HCC)      R 80%, L 60%      Neoplasm of uncertain behavior of skin 2011       Past Medical History:   Diagnosis Date    Anemia due to stage 4 chronic kidney disease ([facility] 2021    ASHD (arteriosclerotic heart disease)     Asymptomatic carotid artery stenosis, bilateral 2018    Atherosclerotic AMRIT (renal artery stenosis), bilateral (HCC)     R 80%, L 60%    B12 deficiency     Chronic bilateral low back pain without sciatica 4/3/2017    CKD (chronic kidney disease) stage 4, GFR 15-29 ml/min ([facility])     Dementia ([facility]     Diabetic nephropathy ([facility]     DM2 (diabetes mellitus, type 2) ([facility])     Dyslipidemia     Glaucoma     Hemorrhoids     Hypertension, essential     Osteoarthritis        Past Surgical History:   Procedure Laterality Date    CATARACT REMOVAL      LEFT    COLONOSCOPY  2010    DR Daljit Palacios    COLONOSCOPY  2013    DR. SALMON    CORONARY ANGIOPLASTY      CORONARY ARTERY BYPASS GRAFT      1 vessel CABG    DENTAL SURGERY      MI COLON CA SCRN NOT HI RSK IND N/A 2017    COLONOSCOPY performed by Wesley Morse [facility] at [facility]    RIGHT BREAST       Allergies   Allergen Reactions    Enalapril      FATIGUE    Other      MYOVIEW DYE-STRESS TEST    Bactrim Itching       Social History     Tobacco Use    Smoking status: Former Smoker     Packs/day: 0.50     Years: 30.00     Pack years: 15.00     Types: Cigarettes     Quit date: 2000     Years since quittin.1    Smokeless tobacco: Never Used    Tobacco comment: quit 17 years ago   Substance Use Topics    Alcohol use: No       Family History   Problem Relation Age of Onset    Heart Disease Mother     Lupus Sister     Diabetes Brother          I have reviewed the patient's past medical history, past surgical history, allergies, medications, social and family history and I have made updates where appropriate. Review of Systems  Positive responses are highlighted in bold    Constitutional:  Fever, Chills, Night Sweats, Fatigue, Unexpected changes in weight  Eyes:  Eye discharge, Eye pain, Eye redness, Visual disturbances   HENT:  Ear pain, Tinnitus, Nosebleeds, Trouble swallowing, Hearing loss, Sore throat  Cardiovascular:  Chest Pain, Palpitations, Orthopnea, Paroxysmal Nocturnal Dyspnea  Respiratory:  Cough, Wheezing, Shortness of breath, Chest tightness, Apnea  Gastrointestinal:  Nausea, Vomiting, Diarrhea, Constipation, Heartburn, Blood in stool  Genitourinary:  Difficulty or painful urination, Flank pain, Change in frequency, Urgency  Skin:  Color change, Rash, Itching, Wound  Psychiatric:  Hallucinations, Anxiety, Depression, Suicidal ideation  Hematological:  Enlarged glands, Easy bleeding, Easily bruising  Musculoskeletal:  Joint pain, Back pain, Gait problems, Joint swelling, Myalgias  Neurological:  Dizziness, Headaches, Presyncope, Numbness, Seizures, Tremors  Allergy:  Environmental allergies, Food allergies  Endocrine:  Heat Intolerance, Cold Intolerance, Polydipsia, Polyphagia, Polyuria      PHYSICAL EXAM:  Vitals:    04/06/21 1244   BP: 120/74   Pulse: 66   Resp: 10   Temp: 98.5 °F (36.9 °C)   TempSrc: Oral   SpO2: 98%   Weight: 155 lb 6.4 oz (70.5 kg)   Height: 5' 2.99\" (1.6 m)     Body mass index is 27.53 kg/m². Pain Score:    3(Chronic low back pain)    VS Reviewed  General Appearance: A&O x 3, No acute distress,well developed and well- nourished  Head: normocephalic and atraumatic  Eyes: pupils equal, round, and reactive to light, extraocular eye movements intact, conjunctivae and eye lids without erythema  ENT: external ear and ear canal clear bilaterally, TMs intact and regular, nose without deformity, nasal mucosa and turbinates normal without polyps, oropharynx normal, dentition is normal for age  Neck: supple and non-tender without mass, no thyromegaly or thyroid nodules, no cervical lymphadenopathy  Pulmonary/Chest: clear to auscultation bilaterally- no wheezes, rales or rhonchi, normal air movement, no respiratory distress or retractions  Cardiovascular: S1 and S2 auscultated w/ RRR. No murmurs, rubs, clicks, or gallops, distal pulses intact. Abdomen: soft, non-tender, non-distended, bowel sounds physiologic,  no rebound or guarding, no masses or hernias noted. Liver and spleen without enlargement. Extremities: no cyanosis, clubbing or edema of the lower extremities. +2 PT/DP bilaterally. Musculoskeletal: No joint swelling or gross deformity   Neuro:  Alert, 5/5 strength globally and symmetrically, 2+ patellar reflexes bilaterally  Psych: Affect appropriate. Mood normal. Thought process is normal without evidence of depression or psychosis. Good insight and appropriate interaction. Cognition and memory appear to be impaired. Skin: warm and dry, no rash or erythema  Lymph:  No cervical, auricular or supraclavicular lymph nodes palpated  Foot: Bilat 2+DP/PT bilaterally. Skin warm, dry and intact. Sensation normal throughout to touch and with monofilament. A1C today: 5.1      Lab Results   Component Value Date    LABA1C 4.9 01/18/2021    LABA1C 4.9 01/04/2021    LABA1C 5.8 04/04/2019    LABA1C 6.7 10/09/2018    LABA1C 5.8 05/17/2018    LABA1C 6.4 04/05/2018     Lab Results   Component Value Date    WBC 5.5 03/01/2021    HGB 8.4 (L) 03/01/2021    HCT 25.1 (L) 03/01/2021    MCV 90.5 03/01/2021     03/01/2021     Lab Results   Component Value Date     03/01/2021    K 4.3 03/01/2021    CL 95 03/01/2021    CO2 21 03/01/2021    BUN 27 03/01/2021    CREATININE 2.96 03/01/2021    GLUCOSE 228 03/01/2021    GLUCOSE 124 12/07/2020    CALCIUM 8.8 03/01/2021        ASSESSMENT & PLAN  1.  Type 2 diabetes mellitus sciatica    Stable  con't prn norco  I'm ok to take over when RF needed      DISPOSITION    Return in about 3 months (around 7/6/2021) for Follow-up Diabetes, follow-up on chronic medical conditions, sooner as needed. Cassidy Dozier released without restrictions. Future Appointments   Date Time Provider Figueroa Campuzano   7/7/2021  9:20 AM [address] [place] [address]    Counseling was provided today regarding the following topics: Healthy eating habits, Regular exercise, substance abuse and healthy sleep habits. Zaynab received counseling on the following healthy behaviors: nutrition, exercise and medication adherence    Patient given educational materials on: See Attached    I have instructed Zaynab to complete a self tracking handout on Blood Sugars  and Blood Pressures  and instructed them to bring it with them to her next appointment. Barriers to learning and self management: dementia, daugther present    Discussed use, benefit, and side effects of prescribed medications. Barriers to medication compliance addressed. All patient questions answered. Pt voiced understanding.        Electronically signed by Kacey Stein DO on 4/6/2021 at 1:18 PM

## 2021-04-06 ENCOUNTER — OFFICE VISIT (OUTPATIENT)
Dept: FAMILY MEDICINE CLINIC | Age: 81
End: 2021-04-06
Payer: MEDICARE

## 2021-04-06 ENCOUNTER — TELEPHONE (OUTPATIENT)
Dept: FAMILY MEDICINE CLINIC | Age: 81
End: 2021-04-06

## 2021-04-06 VITALS
DIASTOLIC BLOOD PRESSURE: 74 MMHG | BODY MASS INDEX: 27.54 KG/M2 | HEIGHT: 63 IN | TEMPERATURE: 98.5 F | RESPIRATION RATE: 10 BRPM | HEART RATE: 66 BPM | OXYGEN SATURATION: 98 % | WEIGHT: 155.4 LBS | SYSTOLIC BLOOD PRESSURE: 120 MMHG

## 2021-04-06 DIAGNOSIS — N18.4 CKD (CHRONIC KIDNEY DISEASE) STAGE 4, GFR 15-29 ML/MIN (HCC): Chronic | ICD-10-CM

## 2021-04-06 DIAGNOSIS — I25.10 ASHD (ARTERIOSCLEROTIC HEART DISEASE): Chronic | ICD-10-CM

## 2021-04-06 DIAGNOSIS — I10 HYPERTENSION, ESSENTIAL: Chronic | ICD-10-CM

## 2021-04-06 DIAGNOSIS — F03.90 DEMENTIA WITHOUT BEHAVIORAL DISTURBANCE, UNSPECIFIED DEMENTIA TYPE: Chronic | ICD-10-CM

## 2021-04-06 DIAGNOSIS — I65.23 ASYMPTOMATIC CAROTID ARTERY STENOSIS, BILATERAL: Chronic | ICD-10-CM

## 2021-04-06 DIAGNOSIS — G89.29 CHRONIC BILATERAL LOW BACK PAIN WITHOUT SCIATICA: ICD-10-CM

## 2021-04-06 DIAGNOSIS — I70.1 ATHEROSCLEROTIC RAS (RENAL ARTERY STENOSIS), BILATERAL (HCC): Chronic | ICD-10-CM

## 2021-04-06 DIAGNOSIS — N18.4 ANEMIA DUE TO STAGE 4 CHRONIC KIDNEY DISEASE (HCC): Chronic | ICD-10-CM

## 2021-04-06 DIAGNOSIS — M54.50 CHRONIC BILATERAL LOW BACK PAIN WITHOUT SCIATICA: ICD-10-CM

## 2021-04-06 DIAGNOSIS — D63.1 ANEMIA DUE TO STAGE 4 CHRONIC KIDNEY DISEASE (HCC): Chronic | ICD-10-CM

## 2021-04-06 DIAGNOSIS — E11.21 TYPE 2 DIABETES MELLITUS WITH DIABETIC NEPHROPATHY, WITHOUT LONG-TERM CURRENT USE OF INSULIN (HCC): Primary | Chronic | ICD-10-CM

## 2021-04-06 PROBLEM — N17.9 ACUTE-ON-CHRONIC RENAL FAILURE (HCC): Status: RESOLVED | Noted: 2021-04-06 | Resolved: 2021-04-06

## 2021-04-06 PROBLEM — N18.9 ACUTE-ON-CHRONIC RENAL FAILURE (HCC): Status: ACTIVE | Noted: 2021-04-06

## 2021-04-06 PROBLEM — D70.9 AGRANULOCYTOSIS (HCC): Status: ACTIVE | Noted: 2021-04-06

## 2021-04-06 PROBLEM — H53.9 DISORDER OF VISION: Status: ACTIVE | Noted: 2021-04-06

## 2021-04-06 PROBLEM — D70.9 AGRANULOCYTOSIS (HCC): Status: RESOLVED | Noted: 2021-04-06 | Resolved: 2021-04-06

## 2021-04-06 PROBLEM — N17.9 ACUTE-ON-CHRONIC RENAL FAILURE (HCC): Status: ACTIVE | Noted: 2021-04-06

## 2021-04-06 PROBLEM — H53.9 DISORDER OF VISION: Status: RESOLVED | Noted: 2021-04-06 | Resolved: 2021-04-06

## 2021-04-06 PROBLEM — K92.9 DIGESTIVE SYSTEM DISORDER: Status: ACTIVE | Noted: 2021-04-06

## 2021-04-06 PROBLEM — S42.209A FRACTURE OF UPPER END OF HUMERUS: Status: ACTIVE | Noted: 2021-04-06

## 2021-04-06 PROBLEM — K92.9 DIGESTIVE SYSTEM DISORDER: Status: RESOLVED | Noted: 2021-04-06 | Resolved: 2021-04-06

## 2021-04-06 PROBLEM — N18.9 ACUTE-ON-CHRONIC RENAL FAILURE (HCC): Status: RESOLVED | Noted: 2021-04-06 | Resolved: 2021-04-06

## 2021-04-06 LAB — HBA1C MFR BLD: 5.1 % (ref 4.3–5.7)

## 2021-04-06 PROCEDURE — 1036F TOBACCO NON-USER: CPT | Performed by: FAMILY MEDICINE

## 2021-04-06 PROCEDURE — 1090F PRES/ABSN URINE INCON ASSESS: CPT | Performed by: FAMILY MEDICINE

## 2021-04-06 PROCEDURE — G8427 DOCREV CUR MEDS BY ELIG CLIN: HCPCS | Performed by: FAMILY MEDICINE

## 2021-04-06 PROCEDURE — 99204 OFFICE O/P NEW MOD 45 MIN: CPT | Performed by: FAMILY MEDICINE

## 2021-04-06 PROCEDURE — G8399 PT W/DXA RESULTS DOCUMENT: HCPCS | Performed by: FAMILY MEDICINE

## 2021-04-06 PROCEDURE — 1123F ACP DISCUSS/DSCN MKR DOCD: CPT | Performed by: FAMILY MEDICINE

## 2021-04-06 PROCEDURE — 4040F PNEUMOC VAC/ADMIN/RCVD: CPT | Performed by: FAMILY MEDICINE

## 2021-04-06 PROCEDURE — G8417 CALC BMI ABV UP PARAM F/U: HCPCS | Performed by: FAMILY MEDICINE

## 2021-04-06 SDOH — ECONOMIC STABILITY: TRANSPORTATION INSECURITY
IN THE PAST 12 MONTHS, HAS THE LACK OF TRANSPORTATION KEPT YOU FROM MEDICAL APPOINTMENTS OR FROM GETTING MEDICATIONS?: NO

## 2021-04-06 SDOH — ECONOMIC STABILITY: TRANSPORTATION INSECURITY
IN THE PAST 12 MONTHS, HAS LACK OF TRANSPORTATION KEPT YOU FROM MEETINGS, WORK, OR FROM GETTING THINGS NEEDED FOR DAILY LIVING?: NO

## 2021-04-06 ASSESSMENT — PATIENT HEALTH QUESTIONNAIRE - PHQ9
1. LITTLE INTEREST OR PLEASURE IN DOING THINGS: 0
SUM OF ALL RESPONSES TO PHQ QUESTIONS 1-9: 0
SUM OF ALL RESPONSES TO PHQ QUESTIONS 1-9: 0
SUM OF ALL RESPONSES TO PHQ9 QUESTIONS 1 & 2: 0
2. FEELING DOWN, DEPRESSED OR HOPELESS: 0

## 2021-04-06 NOTE — TELEPHONE ENCOUNTER
LMOM to call back at earliest convenience Answering machine was to [employer] not on [employer]

## 2021-04-06 NOTE — PATIENT INSTRUCTIONS
-Please check sugars once daily and fax results to my office in 1 week. LAB INSTRUCTIONS:    Please complete labs within 2 week(s). Please fast for 8 hours prior to lab collection. The clinic will call you within 1 week of collection. If you have not heard from us within that amount of time, please call us at [phone]. Patient Education        Type 2 Diabetes: Care Instructions  Your Care Instructions     Type 2 diabetes is a disease that develops when the body's tissues cannot use insulin properly. Over time, the pancreas cannot make enough insulin. Insulin is a hormone that helps the body's cells use sugar (glucose) for energy. It also helps the body store extra sugar in muscle, fat, and liver cells. Without insulin, the sugar cannot get into the cells to do its work. It stays in the blood instead. This can cause high blood sugar levels. A person has diabetes when the blood sugar stays too high too much of the time. Over time, diabetes can lead to diseases of the heart, blood vessels, nerves, kidneys, and eyes. You may be able to control your blood sugar by losing weight, eating a healthy diet, and getting daily exercise. You may also have to take insulin or other diabetes medicine. Follow-up care is a key part of your treatment and safety. Be sure to make and go to all appointments. Call your doctor if you are having problems. It's also a good idea to know your test results and keep a list of the medicines you take. How can you care for yourself at home? · Keep your blood sugar at a target level (which you set with your doctor). ? Carbohydratethe body's main source of fuelaffects blood sugar more than any other nutrient. Carbohydrate is in fruits, vegetables, milk, and yogurt. It also is in breads, cereals, vegetables such as potatoes and corn, and sugary foods such as candy and cakes. Follow your meal plan to know how much carbohydrate to eat at each meal and snack. ?  Aim for 30 minutes of exercise on most, preferably all, days of the week. Walking is a good choice. You also may want to do other activities, such as running, swimming, cycling, or playing tennis or team sports. Try to do muscle-strengthening exercises at least 2 times a week. ? Take your medicines exactly as prescribed. Call your doctor if you think you are having a problem with your medicine. You will get more details on the specific medicines your doctor prescribes. · Check your blood sugar as often as your doctor recommends. It is important to keep track of any symptoms you have, such as low blood sugar. Also tell your doctor if you have any changes in your activities, diet, or insulin use. · Talk to your doctor before you start taking aspirin every day. Aspirin can help certain people lower their risk of a heart attack or stroke. But taking aspirin isn't right for everyone, because it can cause serious bleeding. · Do not smoke. If you need help quitting, talk to your doctor about stop-smoking programs and medicines. These can increase your chances of quitting for good. · Keep your cholesterol and blood pressure at normal levels. You may need to take one or more medicines to reach your goals. Take them exactly as directed. Do not stop or change a medicine without talking to your doctor first.  When should you call for help? Call 911 anytime you think you may need emergency care. For example, call if:    · You passed out (lost consciousness), or you suddenly become very sleepy or confused. (You may have very low blood sugar.)   Call your doctor now or seek immediate medical care if:    · Your blood sugar is 300 mg/dL or is higher than the level your doctor has set for you.     · You have symptoms of low blood sugar, such as:  ? Sweating. ? Feeling nervous, shaky, and weak. ? Extreme hunger and slight nausea. ? Dizziness and headache.  ? Blurred vision. ? Confusion.    Watch closely for changes in your health, and be sure to contact your doctor if:    · You often have problems controlling your blood sugar.     · You have symptoms of long-term diabetes problems, such as:  ? New vision changes. ? New pain, numbness, or tingling in your hands or feet. ? Skin problems. Where can you learn more? Go to [email or web address] org and sign in to your [employer] account. Enter C553 in the Preen.Me box to learn more about \"Type 2 Diabetes: Care Instructions. \"     If you do not have an account, please click on the \"Sign Up Now\" link. Current as of: August 31, 2020               Content Version: 12.8  © 2006-2021 [employer]wise[employer]. Care instructions adapted under license by [facility] ([facility]). If you have questions about a medical condition or this instruction, always ask your healthcare professional. [address] any warranty or liability for your use of this information.

## 2021-04-06 NOTE — TELEPHONE ENCOUNTER
Scheduling bilat  carotid doppler 04/08/21@ [facility] @ 9:00  Arrive @ 8:30 to 2nd floor radiology .    Wear mask at hospital

## 2021-04-06 NOTE — TELEPHONE ENCOUNTER
I spoke with Brionna Keith from [facility] [phone] who states that she will fax over the memory exam report

## 2021-04-16 ENCOUNTER — HOSPITAL ENCOUNTER (OUTPATIENT)
Dept: INTERVENTIONAL RADIOLOGY/VASCULAR | Age: 81
Discharge: HOME | End: 2021-04-16
Payer: MEDICARE

## 2021-04-16 DIAGNOSIS — I65.23 ASYMPTOMATIC CAROTID ARTERY STENOSIS, BILATERAL: Chronic | ICD-10-CM

## 2021-04-16 PROCEDURE — 93880 EXTRACRANIAL BILAT STUDY: CPT

## 2021-04-19 ENCOUNTER — NURSE ONLY (OUTPATIENT)
Dept: LAB | Age: 81
End: 2021-04-19

## 2021-04-19 ENCOUNTER — TELEPHONE (OUTPATIENT)
Dept: FAMILY MEDICINE CLINIC | Age: 81
End: 2021-04-19

## 2021-04-19 DIAGNOSIS — I25.10 ASHD (ARTERIOSCLEROTIC HEART DISEASE): Chronic | ICD-10-CM

## 2021-04-19 DIAGNOSIS — I10 HYPERTENSION, ESSENTIAL: Chronic | ICD-10-CM

## 2021-04-19 DIAGNOSIS — D63.1 ANEMIA DUE TO STAGE 4 CHRONIC KIDNEY DISEASE (HCC): Chronic | ICD-10-CM

## 2021-04-19 DIAGNOSIS — N18.4 CKD (CHRONIC KIDNEY DISEASE) STAGE 4, GFR 15-29 ML/MIN (HCC): Chronic | ICD-10-CM

## 2021-04-19 DIAGNOSIS — N18.4 ANEMIA DUE TO STAGE 4 CHRONIC KIDNEY DISEASE (HCC): Chronic | ICD-10-CM

## 2021-04-19 DIAGNOSIS — E11.21 TYPE 2 DIABETES MELLITUS WITH DIABETIC NEPHROPATHY, WITHOUT LONG-TERM CURRENT USE OF INSULIN (HCC): Chronic | ICD-10-CM

## 2021-04-19 LAB
ALBUMIN SERPL-MCNC: 4.4 G/DL (ref 3.5–5.1)
ALP BLD-CCNC: 75 U/L (ref 38–126)
ALT SERPL-CCNC: 12 U/L (ref 11–66)
ANION GAP SERPL CALCULATED.3IONS-SCNC: 16 MEQ/L (ref 8–16)
AST SERPL-CCNC: 17 U/L (ref 5–40)
BASOPHILS # BLD: 1.4 %
BASOPHILS ABSOLUTE: 0.1 THOU/MM3 (ref 0–0.1)
BILIRUB SERPL-MCNC: 0.4 MG/DL (ref 0.3–1.2)
BUN BLDV-MCNC: 39 MG/DL (ref 7–22)
CALCIUM SERPL-MCNC: 9.4 MG/DL (ref 8.5–10.5)
CHLORIDE BLD-SCNC: 97 MEQ/L (ref 98–111)
CHOLESTEROL, TOTAL: 112 MG/DL (ref 100–199)
CO2: 21 MEQ/L (ref 23–33)
CREAT SERPL-MCNC: 3 MG/DL (ref 0.4–1.2)
EOSINOPHIL # BLD: 2.6 %
EOSINOPHILS ABSOLUTE: 0.1 THOU/MM3 (ref 0–0.4)
ERYTHROCYTE [DISTWIDTH] IN BLOOD BY AUTOMATED COUNT: 12.8 % (ref 11.5–14.5)
ERYTHROCYTE [DISTWIDTH] IN BLOOD BY AUTOMATED COUNT: 44.9 FL (ref 35–45)
GFR SERPL CREATININE-BSD FRML MDRD: 15 ML/MIN/1.73M2
GLUCOSE BLD-MCNC: 140 MG/DL (ref 70–108)
HCT VFR BLD CALC: 27.7 % (ref 37–47)
HDLC SERPL-MCNC: 55 MG/DL
HEMOGLOBIN: 8.7 GM/DL (ref 12–16)
IMMATURE GRANS (ABS): 0.02 THOU/MM3 (ref 0–0.07)
IMMATURE GRANULOCYTES: 0.4 %
LDL CHOLESTEROL CALCULATED: 40 MG/DL
LYMPHOCYTES # BLD: 11.9 %
LYMPHOCYTES ABSOLUTE: 0.7 THOU/MM3 (ref 1–4.8)
MCH RBC QN AUTO: 30.4 PG (ref 26–33)
MCHC RBC AUTO-ENTMCNC: 31.4 GM/DL (ref 32.2–35.5)
MCV RBC AUTO: 96.9 FL (ref 81–99)
MONOCYTES # BLD: 13.7 %
MONOCYTES ABSOLUTE: 0.8 THOU/MM3 (ref 0.4–1.3)
NUCLEATED RED BLOOD CELLS: 0 /100 WBC
PLATELET # BLD: 270 THOU/MM3 (ref 130–400)
PMV BLD AUTO: 8.7 FL (ref 9.4–12.4)
POTASSIUM SERPL-SCNC: 3.9 MEQ/L (ref 3.5–5.2)
RBC # BLD: 2.86 MILL/MM3 (ref 4.2–5.4)
SEG NEUTROPHILS: 70 %
SEGMENTED NEUTROPHILS ABSOLUTE COUNT: 4 THOU/MM3 (ref 1.8–7.7)
SODIUM BLD-SCNC: 134 MEQ/L (ref 135–145)
TOTAL PROTEIN: 7 G/DL (ref 6.1–8)
TRIGL SERPL-MCNC: 86 MG/DL (ref 0–199)
TSH SERPL DL<=0.05 MIU/L-ACNC: 1.27 UIU/ML (ref 0.4–4.2)
VITAMIN D 25-HYDROXY: 46 NG/ML (ref 30–100)
WBC # BLD: 5.7 THOU/MM3 (ref 4.8–10.8)

## 2021-04-19 NOTE — TELEPHONE ENCOUNTER
Called pts daughter to review carotid US  Left VM  Will try to call back later      Narrative   PROCEDURE: Bilateral carotid ultrasound       CLINICAL INFORMATION: Carotid artery stenosis.       COMPARISON: No prior study.       TECHNIQUE: Grayscale, color flow and spectral waveform ultrasound images were obtained through the bilateral extracranial carotid and vertebral arteries. Peak systolic and end-diastolic velocities were measured.       FINDINGS:       The right common carotid artery appears grossly patent. There is moderate partially calcified atherosclerotic plaque demonstrated at the right carotid bifurcation. The right ICA to CCA ratio is within normal limits measuring 1.07. There is antegrade flow    within the right vertebral artery.       The left common carotid artery appears grossly patent. There is moderate to marked partially calcified atherosclerotic plaque demonstrated at the left carotid bifurcation. The left ICA to CCA ratio is elevated measuring 3.07. There is antegrade flow    within the left vertebral artery.       RIGHT       PSV/EDV       DIST CCA-------->214/35cm/s   PROX ICA-------->228/50cm/s   ECA---------------->309/21cm/s   VERT-------------->66/17cm/s           LEFT       PSV/EDV       DIST CCA-------->85/17cm/s   PROX ICA-------->261/57cm/s   ECA---------------->229/10cm/s   VERT-------------->86/15cm/s           Impression   1. Greater than 70% stenosis but less than near occlusion is noted within the proximal left internal carotid artery. 2. There is suggested stenosis of 50-69% within the proximal right internal carotid artery. However, the peak systolic velocity within the proximal right internal carotid artery could be falsely elevated due to contralateral stenosis. 3. Antegrade flow within the vertebral arteries bilaterally.    4. Consider further characterization of the above findings with CT of the neck.               **This report has been created using voice recognition software.  It may contain minor errors which are inherent in voice recognition technology. **       Final report electronically signed by Dr. Marquise Hawkins on 4/16/2021 1:33 PM

## 2021-04-20 DIAGNOSIS — I65.23 ASYMPTOMATIC CAROTID ARTERY STENOSIS, BILATERAL: Primary | ICD-10-CM

## 2021-04-21 ENCOUNTER — OFFICE VISIT (OUTPATIENT)
Dept: CARDIOLOGY CLINIC | Age: 81
End: 2021-04-21
Payer: MEDICARE

## 2021-04-21 VITALS
BODY MASS INDEX: 28.04 KG/M2 | HEIGHT: 62 IN | HEART RATE: 64 BPM | WEIGHT: 152.38 LBS | SYSTOLIC BLOOD PRESSURE: 124 MMHG | DIASTOLIC BLOOD PRESSURE: 53 MMHG

## 2021-04-21 DIAGNOSIS — I25.10 CORONARY ARTERY DISEASE INVOLVING NATIVE CORONARY ARTERY OF NATIVE HEART WITHOUT ANGINA PECTORIS: Primary | ICD-10-CM

## 2021-04-21 DIAGNOSIS — N18.4 CKD (CHRONIC KIDNEY DISEASE) STAGE 4, GFR 15-29 ML/MIN (HCC): Chronic | ICD-10-CM

## 2021-04-21 DIAGNOSIS — I65.23 BILATERAL CAROTID ARTERY STENOSIS: ICD-10-CM

## 2021-04-21 DIAGNOSIS — E78.5 DYSLIPIDEMIA: Chronic | ICD-10-CM

## 2021-04-21 DIAGNOSIS — I10 HYPERTENSION, ESSENTIAL: Chronic | ICD-10-CM

## 2021-04-21 DIAGNOSIS — R06.09 DOE (DYSPNEA ON EXERTION): ICD-10-CM

## 2021-04-21 PROBLEM — I77.9 BILATERAL CAROTID ARTERY DISEASE (HCC): Status: ACTIVE | Noted: 2021-04-21

## 2021-04-21 PROCEDURE — 4040F PNEUMOC VAC/ADMIN/RCVD: CPT | Performed by: INTERNAL MEDICINE

## 2021-04-21 PROCEDURE — 1123F ACP DISCUSS/DSCN MKR DOCD: CPT | Performed by: INTERNAL MEDICINE

## 2021-04-21 PROCEDURE — 1036F TOBACCO NON-USER: CPT | Performed by: INTERNAL MEDICINE

## 2021-04-21 PROCEDURE — G8417 CALC BMI ABV UP PARAM F/U: HCPCS | Performed by: INTERNAL MEDICINE

## 2021-04-21 PROCEDURE — 99204 OFFICE O/P NEW MOD 45 MIN: CPT | Performed by: INTERNAL MEDICINE

## 2021-04-21 PROCEDURE — 93000 ELECTROCARDIOGRAM COMPLETE: CPT | Performed by: INTERNAL MEDICINE

## 2021-04-21 PROCEDURE — G8399 PT W/DXA RESULTS DOCUMENT: HCPCS | Performed by: INTERNAL MEDICINE

## 2021-04-21 PROCEDURE — G8427 DOCREV CUR MEDS BY ELIG CLIN: HCPCS | Performed by: INTERNAL MEDICINE

## 2021-04-21 PROCEDURE — 1090F PRES/ABSN URINE INCON ASSESS: CPT | Performed by: INTERNAL MEDICINE

## 2021-04-21 NOTE — PROGRESS NOTES
Chief Complaint   Patient presents with    New Patient   New patient here for check up to establish care. Pt is at [facility]  From out of town recently    Daughter states has blockage in carotid was seen on scan by PCP    Abnormal carotid oppler      CKD IV and yet to see nephrologist  Denied chest pain  Walk with walker  CRAVEN    Denied dizziness or palpitations    Had CABG x1 to LAD  The year 2000    EKG done today    Pt states med list is correct from PCP appt 4/6/21    Ex smoker for over 21 yrs( since 2000)    Hx of chronic anemia    FHX    Her sister had CAD        Patient Active Problem List   Diagnosis    Glaucoma    Atherosclerotic AMRIT (renal artery stenosis), bilateral ([address]    Neoplasm of uncertain behavior of skin    Colon polyps    Chronic bilateral low back pain without sciatica    Iron deficiency anemia    B12 deficiency    Asymptomatic carotid artery stenosis, bilateral    Fracture of upper end of humerus    Anemia due to stage 4 chronic kidney disease ([address]    ASHD (arteriosclerotic heart disease)    CKD (chronic kidney disease) stage 4, GFR 15-29 ml/min (HCC)    Dementia ([address]    DM2 (diabetes mellitus, type 2) ([address]    Dyslipidemia    Hemorrhoids    Hypertension, essential    Osteoarthritis    CRAVEN (dyspnea on exertion)    Bilateral carotid artery disease (HCC) Left >> Rt    Coronary artery disease involving native coronary artery of native heart without angina pectoris       Past Surgical History:   Procedure Laterality Date    CATARACT REMOVAL      LEFT    COLONOSCOPY  06/23/2010    DR Izaiah Wheatley    COLONOSCOPY  08/30/2013    DR. SALMON    CORONARY ANGIOPLASTY  2009    CORONARY ARTERY BYPASS GRAFT  2000    1 vessel CABG    DENTAL SURGERY      [place] COLON CA SCRN NOT HI RSK IND N/A 04/14/2017    COLONOSCOPY performed by Clinton Olvera MD at [facility]    RIGHT BREAST       Allergies   Allergen Reactions    Enalapril FATIGUE    Other      MYOVIEW DYE-STRESS TEST    Bactrim Itching        Family History   Problem Relation Age of Onset    Heart Disease Mother     Lupus Sister     Diabetes Brother         Social History     Socioeconomic History    Marital status:      Spouse name: Not on file    Number of children: Not on file    Years of education: Not on file    Highest education level: Not on file   Occupational History    Not on file   Social Needs    Financial resource strain: Not hard at all   [address] insecurity     Worry: Never true     Inability: Never true   [employer] needs     Medical: No     Non-medical: No   Tobacco Use    Smoking status: Former Smoker     Packs/day: 0.50     Years: 30.00     Pack years: 15.00     Types: Cigarettes     Quit date: 2000     Years since quittin.1    Smokeless tobacco: Never Used    Tobacco comment: quit 17 years ago   Substance and Sexual Activity    Alcohol use: No    Drug use: No    Sexual activity: Not on file   Lifestyle    Physical activity     Days per week: Not on file     Minutes per session: Not on file    Stress: Not on file   Relationships    Social connections     Talks on phone: Not on file     Gets together: Not on file     Attends [employer]ism service: Not on file     Active member of club or organization: Not on file     Attends meetings of clubs or organizations: Not on file     Relationship status: Not on file    Intimate partner violence     Fear of current or ex partner: Not on file     Emotionally abused: Not on file     Physically abused: Not on file     Forced sexual activity: Not on file   Other Topics Concern    Not on file   Social History Narrative    Not on file       Current Outpatient Medications   Medication Sig Dispense Refill    acetaminophen (TYLENOL) 325 MG tablet Take 650 mg by mouth every 4 hours as needed for Pain or Fever      amLODIPine (NORVASC) 10 MG tablet Take 10 mg by mouth daily      loperamide (IMODIUM) 2 MG capsule Take 2 mg by mouth 2 times daily as needed for Diarrhea      docusate sodium (COLACE) 100 MG capsule Take 100 mg by mouth 2 times daily as needed for Constipation      donepezil (ARICEPT) 5 MG tablet Take 5 mg by mouth nightly      guaiFENesin (ROBITUSSIN) 100 MG/5ML SOLN oral solution Take 100 mg by mouth every 4 hours as needed for Cough      hydrALAZINE (APRESOLINE) 100 MG tablet Take 100 mg by mouth 3 times daily      hydrOXYzine (ATARAX) 25 MG tablet Take 25 mg by mouth every 6 hours as needed for Anxiety      bismuth subsalicylate (PEPTO BISMOL) 262 MG/15ML suspension 30 mLs 30 ML PO prn with each loose BM. **Not to exceed 6 doses in 24hours**      Cholecalciferol (VITAMIN D3) 1.25 MG (58868 UT) CAPS Take 1 capsule by mouth once a week      HYDROcodone-acetaminophen (NORCO) 5-325 MG per tablet 1 tab(s) orally every 12 hours, As Needed for pain      Blood Glucose Monitoring Suppl (ONETOUCH VERIO) w/Device KIT 1 Device by Does not apply route 2 times daily 1 kit 0    blood glucose test strips (ONETOUCH VERIO) strip 1 each by In Vitro route daily As needed. 100 each 3    ONETOUCH DELICA LANCETS FINE MISC 1 box by Does not apply route 2 times daily 1 each 3    carvedilol (COREG) 25 MG tablet TAKE 1 TABLET BY MOUTH TWICE DAILY  3    NITROSTAT 0.4 MG SL tablet   3    atorvastatin (LIPITOR) 40 MG tablet Take 40 mg by mouth daily.  fish oil-omega-3 fatty acids 1000 MG capsule Take 3 g by mouth daily.  aspirin 81 MG tablet Take 81 mg by mouth daily.  ferrous sulfate (FE TABS) 325 (65 Fe) MG EC tablet Take 1 tablet by mouth 2 times daily (with meals) 60 tablet 2     No current facility-administered medications for this visit. Review of Systems -     General ROS: negative  Psychological ROS: negative  Hematological and Lymphatic ROS: No history of blood clots or bleeding disorder.    Respiratory ROS: no cough,  or wheezing, the rest see HPI  Cardiovascular ROS: See HPI  Gastrointestinal ROS: negative  Genito-Urinary ROS: no dysuria, trouble voiding, or hematuria  Musculoskeletal ROS: negative  Neurological ROS: no TIA or stroke symptoms  Dermatological ROS: negative      Blood pressure (!) 124/53, pulse 64, height 5' 2\" (1.575 m), weight 152 lb 6 oz (69.1 kg), not currently breastfeeding. Physical Examination:    General appearance - alert, well appearing, and in no distress  HEENT- Pink conjunctiva  , Non-icteri sclera,PERRLA  Mental status - alert, oriented to person, place, and time  Neck - supple, no significant adenopathy, no JVD,   Chest - clear to auscultation, no wheezes, rales or rhonchi, symmetric air entry  Heart - normal rate, regular rhythm, normal S1, S2,   rubs, clicks or gallops  ESM best at aortic area 3/6  B/l carotid bruit noted  Abdomen - soft, nontender, nondistended, no masses or organomegaly  ENZO- no CVA or flank tenderness, no suprapubic tenderness  Neurological - alert, oriented, normal speech, no focal findings or movement disorder noted  Musculoskeletal/limbs - no joint tenderness, deformity or swelling   - peripheral pulses normal, no pedal edema, no clubbing or cyanosis  Skin - normal coloration and turgor, no rashes, no suspicious skin lesions noted  Psych- appropriate mood and affect    Lab  No results for input(s): CKTOTAL, CKMB, CKMBINDEX, TROPONINI in the last 72 hours.   CBC:   Lab Results   Component Value Date    WBC 5.7 04/19/2021    RBC 2.86 04/19/2021    HGB 8.7 04/19/2021    HCT 27.7 04/19/2021    MCV 96.9 04/19/2021    MCH 30.4 04/19/2021    MCHC 31.4 04/19/2021    RDW 13.1 03/01/2021     04/19/2021    MPV 8.7 04/19/2021     BMP:    Lab Results   Component Value Date     04/19/2021    K 3.9 04/19/2021    CL 97 04/19/2021    CO2 21 04/19/2021    BUN 39 04/19/2021    LABALBU 4.4 04/19/2021    LABALBU 3.8 12/07/2020    CREATININE 3.0 04/19/2021    CALCIUM 9.4 04/19/2021    GFRAA 18.5 03/01/2021    LABGLOM 15 exertion)  ECHO Complete 2D W Doppler W Color   3. Bilateral carotid artery stenosis     4. Hypertension, essential     5. Dyslipidemia     6. CKD (chronic kidney disease) stage 4, GFR 15-29 ml/min ([facility])           Plan     Continue the current treatment and with constant vigilance to changes in symptoms and also any potential side effects. Return for care or seek medical attention immediately if symptoms got worse and/or develop new symptoms. meds and labs reviewed    Get cardiology report from her cardiologist    Coronary artery disease, seems to be stable. Denies angina or change in breathing pattern  Baseline echo     Hyperlipidemia: on statins, followed periodically. Patient need periodic lipid and liver profile. Hypertension, on medical treatment. Seems to be under good control. Patient is compliant with medical treatment. Carotid artery stenosis B/l- severe left sided on vascular doppler  Considered  CT of the neck for better  evaluation of the degree of the stenosis   But pat had CKD IV/V and risk is quit high  Consider vascular surgery eval for possible CEA of the left side based on the carotid doppler    CKD IV- to see nephrology    D/w the pat and daughter    Discussed use, benefit, and side effects of prescribed medications. All patient questions answered. Pt voiced understanding. Instructed to continue current medications, diet and exercise. Continue risk factor modification and medical management. Patient agreed with treatment plan. Follow up as directed.           Bernard Duarte

## 2021-04-26 ENCOUNTER — TELEPHONE (OUTPATIENT)
Dept: CARDIOLOGY CLINIC | Age: 81
End: 2021-04-26

## 2021-04-27 ENCOUNTER — HOSPITAL ENCOUNTER (OUTPATIENT)
Dept: NON INVASIVE DIAGNOSTICS | Age: 81
Discharge: HOME | End: 2021-04-27
Payer: MEDICARE

## 2021-04-27 DIAGNOSIS — R06.09 DOE (DYSPNEA ON EXERTION): ICD-10-CM

## 2021-04-27 DIAGNOSIS — I25.10 CORONARY ARTERY DISEASE INVOLVING NATIVE CORONARY ARTERY OF NATIVE HEART WITHOUT ANGINA PECTORIS: ICD-10-CM

## 2021-04-27 LAB
LV EF: 63 %
LVEF MODALITY: NORMAL

## 2021-04-27 PROCEDURE — 93306 TTE W/DOPPLER COMPLETE: CPT

## 2021-05-06 ENCOUNTER — OFFICE VISIT (OUTPATIENT)
Dept: CARDIOTHORACIC SURGERY | Age: 81
End: 2021-05-06
Payer: MEDICARE

## 2021-05-06 VITALS
HEIGHT: 64 IN | BODY MASS INDEX: 26.63 KG/M2 | SYSTOLIC BLOOD PRESSURE: 139 MMHG | DIASTOLIC BLOOD PRESSURE: 57 MMHG | HEART RATE: 65 BPM | WEIGHT: 156 LBS

## 2021-05-06 DIAGNOSIS — I65.23 BILATERAL CAROTID ARTERY STENOSIS: Primary | ICD-10-CM

## 2021-05-06 PROCEDURE — G8399 PT W/DXA RESULTS DOCUMENT: HCPCS | Performed by: THORACIC SURGERY (CARDIOTHORACIC VASCULAR SURGERY)

## 2021-05-06 PROCEDURE — 1090F PRES/ABSN URINE INCON ASSESS: CPT | Performed by: THORACIC SURGERY (CARDIOTHORACIC VASCULAR SURGERY)

## 2021-05-06 PROCEDURE — G8427 DOCREV CUR MEDS BY ELIG CLIN: HCPCS | Performed by: THORACIC SURGERY (CARDIOTHORACIC VASCULAR SURGERY)

## 2021-05-06 PROCEDURE — 1036F TOBACCO NON-USER: CPT | Performed by: THORACIC SURGERY (CARDIOTHORACIC VASCULAR SURGERY)

## 2021-05-06 PROCEDURE — 4040F PNEUMOC VAC/ADMIN/RCVD: CPT | Performed by: THORACIC SURGERY (CARDIOTHORACIC VASCULAR SURGERY)

## 2021-05-06 PROCEDURE — 1123F ACP DISCUSS/DSCN MKR DOCD: CPT | Performed by: THORACIC SURGERY (CARDIOTHORACIC VASCULAR SURGERY)

## 2021-05-06 PROCEDURE — 99204 OFFICE O/P NEW MOD 45 MIN: CPT | Performed by: THORACIC SURGERY (CARDIOTHORACIC VASCULAR SURGERY)

## 2021-05-06 PROCEDURE — G8417 CALC BMI ABV UP PARAM F/U: HCPCS | Performed by: THORACIC SURGERY (CARDIOTHORACIC VASCULAR SURGERY)

## 2021-05-06 NOTE — PROGRESS NOTES
CT/CV Surgery New Patient Office Visit      Patient's Name/Date of Birth: Yadira Elmore / 1940 (80 y.o.)      PCP: Lana Elmore, DO    Date: May 6, 2021     CC: Asymptomatic bilateral carotid artery stenosis. HPI:   We had the pleasure of seeing Yadira Elmore in the office today, as you know this is a very pleasant 80y.o. year old female with a history of hypertension, diabetes mellitus, hypercholesterolemia, coronary artery disease, status post coronary bypass grafting 21 years ago, chronic kidney disease stage IV, and known bilateral carotid artery stenosis. She came to our cardiac vascular surgery clinic for evaluation. She had a follow-up carotid duplex study done recently, which showed greater than 70% stenosis of left internal carotid artery(261/57 cm/sec), and 50 to 69% right carotid artery stenosis(228/50 cm/sec). She denies any symptoms that might be related to carotid artery stenosis. She reports no recent episode of temporary blindness, dizzy spells, syncope, weakness in one side of body, facial drooping, or slurred speech. PastMedical History:  Zofia Beauchamp  has a past medical history of Anemia due to stage 4 chronic kidney disease ([address] ASHD (arteriosclerotic heart disease), Asymptomatic carotid artery stenosis, bilateral, Atherosclerotic AMRIT (renal artery stenosis), bilateral (formerly [facility]), B12 deficiency, Chronic bilateral low back pain without sciatica, CKD (chronic kidney disease) stage 4, GFR 15-29 ml/min (formerly [facility]), Dementia ([address] Diabetic nephropathy ([address] DM2 (diabetes mellitus, type 2) ([facility] Dyslipidemia, Glaucoma, Hemorrhoids, Hypertension, essential, and Osteoarthritis. Past Surgical History:  The patient  has a past surgical history that includes tumor removal (1984); Coronary artery bypass graft (2000); Coronary angioplasty (2009); Cataract removal; Colonoscopy (06/23/2010); Colonoscopy (08/30/2013);  Dental surgery; and pr colon ca scrn not hi rsk ind (N/A, 04/14/2017). Allergies: The patient is allergic to enalapril; other; and bactrim. Medications:    Current Outpatient Medications:     acetaminophen (TYLENOL) 325 MG tablet, Take 650 mg by mouth every 4 hours as needed for Pain or Fever, Disp: , Rfl:     amLODIPine (NORVASC) 10 MG tablet, Take 10 mg by mouth daily, Disp: , Rfl:     loperamide (IMODIUM) 2 MG capsule, Take 2 mg by mouth 2 times daily as needed for Diarrhea, Disp: , Rfl:     docusate sodium (COLACE) 100 MG capsule, Take 100 mg by mouth 2 times daily as needed for Constipation, Disp: , Rfl:     donepezil (ARICEPT) 5 MG tablet, Take 5 mg by mouth nightly, Disp: , Rfl:     guaiFENesin (ROBITUSSIN) 100 MG/5ML SOLN oral solution, Take 100 mg by mouth every 4 hours as needed for Cough, Disp: , Rfl:     hydrALAZINE (APRESOLINE) 100 MG tablet, Take 100 mg by mouth 3 times daily, Disp: , Rfl:     hydrOXYzine (ATARAX) 25 MG tablet, Take 25 mg by mouth every 6 hours as needed for Anxiety, Disp: , Rfl:     bismuth subsalicylate (PEPTO BISMOL) 262 MG/15ML suspension, 30 mLs 30 ML PO prn with each loose BM. **Not to exceed 6 doses in 24hours**, Disp: , Rfl:     Cholecalciferol (VITAMIN D3) 1.25 MG (49207 UT) CAPS, Take 1 capsule by mouth once a week, Disp: , Rfl:     HYDROcodone-acetaminophen (NORCO) 5-325 MG per tablet, 1 tab(s) orally every 12 hours, As Needed for pain, Disp: , Rfl:     Blood Glucose Monitoring Suppl (ONETOUCH VERIO) w/Device KIT, 1 Device by Does not apply route 2 times daily, Disp: 1 kit, Rfl: 0    blood glucose test strips (ONETOUCH VERIO) strip, 1 each by In Vitro route daily As needed. , Disp: 100 each, Rfl: 3    ONETOUCH DELICA LANCETS FINE MISC, 1 box by Does not apply route 2 times daily, Disp: 1 each, Rfl: 3    carvedilol (COREG) 25 MG tablet, TAKE 1 TABLET BY MOUTH TWICE DAILY, Disp: , Rfl: 3    NITROSTAT 0.4 MG SL tablet, , Disp: , Rfl: 3    atorvastatin (LIPITOR) 40 MG tablet, Take 40 mg by mouth daily. , Disp: ,  CKD (chronic kidney disease) stage 4, GFR 15-29 ml/min (HCC)    Dementia (HCC)    DM2 (diabetes mellitus, type 2) (HCC)    Dyslipidemia    Hemorrhoids    Hypertension, essential    Osteoarthritis    CRAVEN (dyspnea on exertion)    Bilateral carotid artery disease (HCC) Left >> Rt    Coronary artery disease involving native coronary artery of native heart without angina pectoris       Assessment: Asymptomatic bilateral carotid artery stenosis, right side worse than left. Plan 5/6/21:  1) I would recommend MRA of the neck. The patient refuses to proceed with the test or surgery. 2) follow-up in 6 months with bilateral carotid duplex study. Thank you for allowing us to be involved in the patient's care.     Electronically by Roberta Rojas MD  on 5/6/2021 at 3:15 PM

## 2021-05-17 ENCOUNTER — OFFICE VISIT (OUTPATIENT)
Dept: NEPHROLOGY | Age: 81
End: 2021-05-17
Payer: MEDICARE

## 2021-05-17 VITALS
TEMPERATURE: 97.3 F | BODY MASS INDEX: 27.53 KG/M2 | WEIGHT: 160.4 LBS | OXYGEN SATURATION: 98 % | DIASTOLIC BLOOD PRESSURE: 49 MMHG | HEART RATE: 61 BPM | SYSTOLIC BLOOD PRESSURE: 125 MMHG

## 2021-05-17 DIAGNOSIS — E08.22 DIABETES MELLITUS DUE TO UNDERLYING CONDITION WITH STAGE 4 CHRONIC KIDNEY DISEASE, WITHOUT LONG-TERM CURRENT USE OF INSULIN (HCC): ICD-10-CM

## 2021-05-17 DIAGNOSIS — N18.4 DIABETES MELLITUS DUE TO UNDERLYING CONDITION WITH STAGE 4 CHRONIC KIDNEY DISEASE, WITHOUT LONG-TERM CURRENT USE OF INSULIN (HCC): ICD-10-CM

## 2021-05-17 DIAGNOSIS — I10 ESSENTIAL HYPERTENSION: ICD-10-CM

## 2021-05-17 DIAGNOSIS — D63.8 ANEMIA OF CHRONIC DISEASE: ICD-10-CM

## 2021-05-17 DIAGNOSIS — N18.4 CKD (CHRONIC KIDNEY DISEASE), STAGE IV (HCC): Primary | ICD-10-CM

## 2021-05-17 PROCEDURE — G8417 CALC BMI ABV UP PARAM F/U: HCPCS | Performed by: INTERNAL MEDICINE

## 2021-05-17 PROCEDURE — 1090F PRES/ABSN URINE INCON ASSESS: CPT | Performed by: INTERNAL MEDICINE

## 2021-05-17 PROCEDURE — 4040F PNEUMOC VAC/ADMIN/RCVD: CPT | Performed by: INTERNAL MEDICINE

## 2021-05-17 PROCEDURE — 99204 OFFICE O/P NEW MOD 45 MIN: CPT | Performed by: INTERNAL MEDICINE

## 2021-05-17 PROCEDURE — 1123F ACP DISCUSS/DSCN MKR DOCD: CPT | Performed by: INTERNAL MEDICINE

## 2021-05-17 PROCEDURE — G8399 PT W/DXA RESULTS DOCUMENT: HCPCS | Performed by: INTERNAL MEDICINE

## 2021-05-17 PROCEDURE — 1036F TOBACCO NON-USER: CPT | Performed by: INTERNAL MEDICINE

## 2021-05-17 PROCEDURE — G8427 DOCREV CUR MEDS BY ELIG CLIN: HCPCS | Performed by: INTERNAL MEDICINE

## 2021-05-17 NOTE — PATIENT INSTRUCTIONS
Drugs to Avoid with Chronic Kidney Disease    Non-steroidal anti-inflammatory drugs (NSAIDS)    Potential complication and side effects of NSAIDS include:   Kidney injury and worsening kidney function    Risk of stomach ulcer and intestinal bleeding   Fluid retention and edema   Increased Blood Pressure    Non-Steroidal Anti-Inflammatory Drugs     Aspirin (Anacin, Ascriptin, abby, Bufferin, Ecotrin, Excedrin)   Celecoxib (Celebrex)   Choline and magnesium salicylates (CMT, Trisosal, Trilisate)   Choline salicylate (Arthropan)   Diclofenac (Voltaren, Arthrotec, Cataflam, Cambia, Voltaren-XR, Zipsor)   Diflunisal (Dolobid)   Etodolac (Lodine XL, Lodine)   Famotidine + Ibuprofen (Duexis)   Fenoprofen (Nalfon, Nalfon 200)   Furbiprofen (Asaid)   Ibuprofen (Advil, Motrin, Midol, Nuprin, Genpril, Dolgesic,Profen,, Vicoprofen,Combunox, Actiprofen, Addaprin, Caldolor, Haltran, Q-Profen,Ibren, Menadol, Rufen,Saleto-200, [place],Ultraprin, Uni-Pro,Wal-Profen)   Indomethacin (Indocin, Indomethegan, Indo-Dmitri)    Ketoprofen (Orudis  KT, Oruvail, Actron)   Ketorolac (Toradol, Sprix)   Magnesium Sulfate (Arthritab, Abby Select, Gordon's pills, Juan Carlos, Mobidin, Mobogesic)   Meclofenamate Sodium (Ponstel)   Meloxicam (Mobic)   Naproxen (Aleve, Anaprox, Naprosyn, EC-Naprosyn, Naprelan, All Day Pain Relief, Aflaxen, Anaprox-DS, Midol Extended Relief, Naprelan,Prevacid NapraPac, Naprapac, Vimovo)   Nabumetone (Relafen)   Oxaprozin (Daypro)   Piroxicam (Feldene)   Rofecoxib (Vioxx)   Salsalate (Amigesic, Anaflex 750, Disalcid, Marthritic, Mono-gesic, Salflex, Salsitab)   Sodium salicylate (various generics)   Sulindac (Clinoril)   Tolmetin (Tolectin)   Valdecoxib (Bextra)   Mefenamic Acid (Ponstel)   Famotidine and Ibuprofen (Duexis)   Meclofenamate (Meclomen)    Antibiotics   Bactrim   Gentamycin   Tobramycin   Vancomycin   Tetracycline   Macrobid     Other                  IV contrast dye

## 2021-05-17 NOTE — PROGRESS NOTES
him November 2020 according to them. I could not find that information. Serum creatinine has ranged between 1.42 mg/dL up to 3.7 mg/dL in the past.  The most recent one was done in April 2021 with serum creatinine of 3.3 mg/dL. In any case she was sent to us to be established with a local nephrologist.  She is doing well herself otherwise. No difficulties with urination. No nausea vomiting. No fever chills. Appetite is good. No chest pain. She has shortness of breath only with moderate exertion and is relieved by rest.  She has gait abnormalities and ambulates with walker. Past Medical History:   Diagnosis Date    Anemia due to stage 4 chronic kidney disease ([facility] 4/6/2021    ASHD (arteriosclerotic heart disease)     Asymptomatic carotid artery stenosis, bilateral 4/22/2018    Atherosclerotic AMRIT (renal artery stenosis), [facility] ([facility])     R 80%, L 60%    B12 deficiency     Chronic bilateral low back pain without sciatica 4/3/2017    CKD (chronic kidney disease) stage 4, GFR 15-29 ml/min ([facility])     Dementia ([address]     Diabetic nephropathy ([address]     DM2 (diabetes mellitus, type 2) ([address]     Dyslipidemia     Glaucoma     Hemorrhoids     Hypertension, essential     Osteoarthritis        Past Surgical History:   Procedure Laterality Date    CATARACT REMOVAL      LEFT    COLONOSCOPY  06/23/2010    DR Thalia Fleming    COLONOSCOPY  08/30/2013    DR. SALMON    CORONARY ANGIOPLASTY  2009    CORONARY ARTERY BYPASS GRAFT  2000    1 vessel CABG    DENTAL SURGERY      [place] COLON CA SCRN NOT HI RSK IND N/A 04/14/2017    COLONOSCOPY performed by Elgin Munoz MD at [facility]    RIGHT BREAST       Family History   Problem Relation Age of Onset    Heart Disease Mother     Lupus Sister     Diabetes Brother         reports that she quit smoking about 21 years ago. Her smoking use included cigarettes. She has a 15.00 pack-year smoking history.  She has never used smokeless tobacco. She reports that she does not drink alcohol and does not use drugs. Allergies:  Enalapril, Other, and Bactrim  Current Outpatient Medications   Medication Sig Dispense Refill    acetaminophen (TYLENOL) 325 MG tablet Take 650 mg by mouth every 4 hours as needed for Pain or Fever      amLODIPine (NORVASC) 10 MG tablet Take 10 mg by mouth daily      loperamide (IMODIUM) 2 MG capsule Take 2 mg by mouth 2 times daily as needed for Diarrhea      docusate sodium (COLACE) 100 MG capsule Take 100 mg by mouth 2 times daily as needed for Constipation      donepezil (ARICEPT) 5 MG tablet Take 5 mg by mouth nightly      guaiFENesin (ROBITUSSIN) 100 MG/5ML SOLN oral solution Take 100 mg by mouth every 4 hours as needed for Cough      hydrALAZINE (APRESOLINE) 100 MG tablet Take 100 mg by mouth 3 times daily      hydrOXYzine (ATARAX) 25 MG tablet Take 25 mg by mouth every 6 hours as needed for Anxiety      bismuth subsalicylate (PEPTO BISMOL) 262 MG/15ML suspension 30 mLs 30 ML PO prn with each loose BM. **Not to exceed 6 doses in 24hours**      Cholecalciferol (VITAMIN D3) 1.25 MG (87176 UT) CAPS Take 1 capsule by mouth once a week      HYDROcodone-acetaminophen (NORCO) 5-325 MG per tablet 1 tab(s) orally every 12 hours, As Needed for pain      Blood Glucose Monitoring Suppl (ONETOUCH VERIO) w/Device KIT 1 Device by Does not apply route 2 times daily 1 kit 0    blood glucose test strips (ONETOUCH VERIO) strip 1 each by In Vitro route daily As needed. 100 each 3    ONETOUCH DELICA LANCETS FINE MISC 1 box by Does not apply route 2 times daily 1 each 3    ferrous sulfate (FE TABS) 325 (65 Fe) MG EC tablet Take 1 tablet by mouth 2 times daily (with meals) 60 tablet 2    carvedilol (COREG) 25 MG tablet TAKE 1 TABLET BY MOUTH TWICE DAILY  3    NITROSTAT 0.4 MG SL tablet   3    atorvastatin (LIPITOR) 40 MG tablet Take 40 mg by mouth daily.       fish oil-omega-3 fatty acids 1000 MG capsule Take 3 g by mouth daily.      aspirin 81 MG tablet Take 81 mg by mouth daily. No current facility-administered medications for this visit. Current Medications:    No current facility-administered medications for this visit. Review of Systems:   Pertinent positives stated above in HPI. All other systems were reviewed and were negative. ROS:As in HPI    Physical exam:   Constitutional: Well-developed elderly lady no distress. Vitals:  Vitals:    05/17/21 1019   BP: (!) 125/49   Pulse: 61   Temp: 97.3 °F (36.3 °C)   SpO2: 98%       Skin: no rash, turgor is normal  Heent: Pupils are reactive to light. Throat is clear. Oral mucosa is moist.  Neck: no bruits or jvd noted   Cardiovascular:  S1, S2 without murmur   Respiratory: Clear with no wheezes,rhonchi or rales   Abdomen: soft,non tender,good bowel sound and no palpable mass  Ext: No lower extremity edema  Psychiatric: mood and affect appropriate  Musculoskeletal:  Rom, muscular strength intact   CNS: Very awake and very alert. Well-oriented. Normal speech. Good motor strength. No obvious focal deficit.     Data:   Labs:  Lab Results   Component Value Date     (L) 04/19/2021     (L) 03/01/2021     (L) 02/03/2021    K 3.9 04/19/2021    K 4.3 03/01/2021    K 3.9 02/03/2021    CL 97 (L) 04/19/2021    CO2 21 (L) 04/19/2021    CO2 21 03/01/2021    CO2 22 02/03/2021    CREATININE 3.0 (HH) 04/19/2021    CREATININE 2.96 (H) 03/01/2021    CREATININE 2.67 (H) 02/03/2021    BUN 39 (H) 04/19/2021    BUN 27 (H) 03/01/2021    BUN 25 (H) 02/03/2021    GLUCOSE 140 (H) 04/19/2021    GLUCOSE 228 (H) 03/01/2021    GLUCOSE 195 (H) 02/03/2021    PHOS 4.4 01/07/2021    PHOS 3.4 12/07/2020    PHOS 4.5 11/13/2020    WBC 5.7 04/19/2021    WBC 5.5 03/01/2021    WBC 5.6 01/18/2021    HGB 8.7 (L) 04/19/2021    HGB 8.4 (L) 03/01/2021    HGB 9.7 (L) 01/18/2021    HCT 27.7 (L) 04/19/2021    HCT 25.1 (L) 03/01/2021    HCT 29.1 (L) 01/18/2021    MCV 96.9 04/19/2021    PLT 270 04/19/2021     {Labs reviewed    Imaging:  CXR results: Thank you Dr. Karen Vazquez DO for allowing us to participate in care of Zaynab Ford   **This report has been created using voice recognition software. It maycontain minor  errors which are inherent in voice recognition technology. **    Electronically signed by Emory Quevedo MD on 5/17/2021 at 10:44 AM

## 2021-06-09 ENCOUNTER — TELEPHONE (OUTPATIENT)
Dept: CARDIOLOGY CLINIC | Age: 81
End: 2021-06-09

## 2021-06-14 ENCOUNTER — NURSE ONLY (OUTPATIENT)
Dept: LAB | Age: 81
End: 2021-06-14

## 2021-06-14 DIAGNOSIS — N18.4 CKD (CHRONIC KIDNEY DISEASE), STAGE IV (HCC): ICD-10-CM

## 2021-06-14 DIAGNOSIS — D63.8 ANEMIA OF CHRONIC DISEASE: ICD-10-CM

## 2021-06-14 LAB
ANION GAP SERPL CALCULATED.3IONS-SCNC: 13 MEQ/L (ref 8–16)
BUN BLDV-MCNC: 36 MG/DL (ref 7–22)
CALCIUM SERPL-MCNC: 9.1 MG/DL (ref 8.5–10.5)
CHLORIDE BLD-SCNC: 97 MEQ/L (ref 98–111)
CO2: 19 MEQ/L (ref 23–33)
CREAT SERPL-MCNC: 3.1 MG/DL (ref 0.4–1.2)
FERRITIN: 47 NG/ML (ref 10–291)
GFR SERPL CREATININE-BSD FRML MDRD: 14 ML/MIN/1.73M2
GLUCOSE BLD-MCNC: 184 MG/DL (ref 70–108)
HCT VFR BLD CALC: 25.1 % (ref 37–47)
HEMOGLOBIN: 7.8 GM/DL (ref 12–16)
IRON SATURATION: 14 % (ref 20–50)
IRON: 42 UG/DL (ref 50–170)
PHOSPHORUS: 4.2 MG/DL (ref 2.4–4.7)
POTASSIUM SERPL-SCNC: 3.4 MEQ/L (ref 3.5–5.2)
PTH INTACT: 98.8 PG/ML (ref 15–65)
SODIUM BLD-SCNC: 129 MEQ/L (ref 135–145)
TOTAL IRON BINDING CAPACITY: 305 UG/DL (ref 171–450)
VITAMIN D 25-HYDROXY: 42 NG/ML (ref 30–100)

## 2021-06-15 ENCOUNTER — TELEPHONE (OUTPATIENT)
Dept: NEPHROLOGY | Age: 81
End: 2021-06-15

## 2021-06-15 DIAGNOSIS — D63.8 ANEMIA OF CHRONIC DISEASE: Primary | ICD-10-CM

## 2021-06-15 DIAGNOSIS — E87.6 HYPOKALEMIA: ICD-10-CM

## 2021-06-15 DIAGNOSIS — E08.22 DIABETES MELLITUS DUE TO UNDERLYING CONDITION WITH STAGE 4 CHRONIC KIDNEY DISEASE, WITHOUT LONG-TERM CURRENT USE OF INSULIN (HCC): ICD-10-CM

## 2021-06-15 DIAGNOSIS — I10 ESSENTIAL HYPERTENSION: ICD-10-CM

## 2021-06-15 DIAGNOSIS — N18.4 DIABETES MELLITUS DUE TO UNDERLYING CONDITION WITH STAGE 4 CHRONIC KIDNEY DISEASE, WITHOUT LONG-TERM CURRENT USE OF INSULIN (HCC): ICD-10-CM

## 2021-06-15 NOTE — TELEPHONE ENCOUNTER
I spoke to Leander Andres about coming in early. Leander Andres said absolutely not you would not want to see what she goes thru to get her there in the morning. She cannot come in any earlier. I spoke to her about he information below. She said you will have to call [facility].

## 2021-06-15 NOTE — TELEPHONE ENCOUNTER
----- Message from Marquise Solorio MD sent at 6/15/2021  5:33 AM EDT -----  Serum potassium is slightly low   Klor con 20 mEq po bid for three days   Repeat potassium with magnesium levels in 6 days

## 2021-06-15 NOTE — TELEPHONE ENCOUNTER
I called and spoke to Alexi Gomez at [facility] about the information below. She expressed understanding. I also faxed the orders.

## 2021-06-21 ENCOUNTER — OFFICE VISIT (OUTPATIENT)
Dept: NEPHROLOGY | Age: 81
End: 2021-06-21
Payer: MEDICARE

## 2021-06-21 ENCOUNTER — NURSE ONLY (OUTPATIENT)
Dept: LAB | Age: 81
End: 2021-06-21

## 2021-06-21 ENCOUNTER — TELEPHONE (OUTPATIENT)
Dept: NEPHROLOGY | Age: 81
End: 2021-06-21

## 2021-06-21 VITALS
TEMPERATURE: 97.7 F | SYSTOLIC BLOOD PRESSURE: 122 MMHG | WEIGHT: 159.2 LBS | OXYGEN SATURATION: 98 % | DIASTOLIC BLOOD PRESSURE: 43 MMHG | BODY MASS INDEX: 27.33 KG/M2 | HEART RATE: 65 BPM

## 2021-06-21 DIAGNOSIS — E87.6 HYPOKALEMIA: ICD-10-CM

## 2021-06-21 DIAGNOSIS — N18.5 CHRONIC KIDNEY DISEASE, STAGE V (HCC): ICD-10-CM

## 2021-06-21 DIAGNOSIS — N18.30 ANEMIA OF CHRONIC RENAL FAILURE, STAGE 3 (MODERATE), UNSPECIFIED WHETHER STAGE 3A OR 3B CKD (HCC): ICD-10-CM

## 2021-06-21 DIAGNOSIS — E08.22 DIABETES MELLITUS DUE TO UNDERLYING CONDITION WITH STAGE 4 CHRONIC KIDNEY DISEASE, WITHOUT LONG-TERM CURRENT USE OF INSULIN (HCC): ICD-10-CM

## 2021-06-21 DIAGNOSIS — I10 ESSENTIAL HYPERTENSION: ICD-10-CM

## 2021-06-21 DIAGNOSIS — D63.1 ANEMIA OF CHRONIC RENAL FAILURE, STAGE 3 (MODERATE), UNSPECIFIED WHETHER STAGE 3A OR 3B CKD (HCC): ICD-10-CM

## 2021-06-21 DIAGNOSIS — D63.8 ANEMIA OF CHRONIC DISEASE: ICD-10-CM

## 2021-06-21 DIAGNOSIS — N18.5 ANEMIA IN STAGE 5 CHRONIC KIDNEY DISEASE (HCC): Primary | ICD-10-CM

## 2021-06-21 DIAGNOSIS — N18.4 DIABETES MELLITUS DUE TO UNDERLYING CONDITION WITH STAGE 4 CHRONIC KIDNEY DISEASE, WITHOUT LONG-TERM CURRENT USE OF INSULIN (HCC): ICD-10-CM

## 2021-06-21 DIAGNOSIS — D63.1 ANEMIA IN STAGE 5 CHRONIC KIDNEY DISEASE (HCC): Primary | ICD-10-CM

## 2021-06-21 DIAGNOSIS — N18.4 CKD (CHRONIC KIDNEY DISEASE), STAGE IV (HCC): Primary | ICD-10-CM

## 2021-06-21 PROBLEM — N18.9 ANEMIA OF CHRONIC RENAL FAILURE: Status: ACTIVE | Noted: 2021-06-21

## 2021-06-21 LAB
MAGNESIUM: 2.1 MG/DL (ref 1.6–2.4)
POTASSIUM SERPL-SCNC: 3.9 MEQ/L (ref 3.5–5.2)

## 2021-06-21 PROCEDURE — 4040F PNEUMOC VAC/ADMIN/RCVD: CPT | Performed by: INTERNAL MEDICINE

## 2021-06-21 PROCEDURE — 1123F ACP DISCUSS/DSCN MKR DOCD: CPT | Performed by: INTERNAL MEDICINE

## 2021-06-21 PROCEDURE — G8427 DOCREV CUR MEDS BY ELIG CLIN: HCPCS | Performed by: INTERNAL MEDICINE

## 2021-06-21 PROCEDURE — 1090F PRES/ABSN URINE INCON ASSESS: CPT | Performed by: INTERNAL MEDICINE

## 2021-06-21 PROCEDURE — G8399 PT W/DXA RESULTS DOCUMENT: HCPCS | Performed by: INTERNAL MEDICINE

## 2021-06-21 PROCEDURE — 1036F TOBACCO NON-USER: CPT | Performed by: INTERNAL MEDICINE

## 2021-06-21 PROCEDURE — G8417 CALC BMI ABV UP PARAM F/U: HCPCS | Performed by: INTERNAL MEDICINE

## 2021-06-21 PROCEDURE — 99213 OFFICE O/P EST LOW 20 MIN: CPT | Performed by: INTERNAL MEDICINE

## 2021-06-21 RX ORDER — SODIUM CHLORIDE 9 MG/ML
INJECTION, SOLUTION INTRAVENOUS CONTINUOUS
Status: CANCELLED | OUTPATIENT
Start: 2021-06-22

## 2021-06-21 RX ORDER — FERRIC CARBOXYMALTOSE INJECTION 50 MG/ML
750 INJECTION, SOLUTION INTRAVENOUS ONCE
Qty: 15 ML | Refills: 0 | Status: SHIPPED | OUTPATIENT
Start: 2021-06-21 | End: 2021-06-21

## 2021-06-21 RX ORDER — DIPHENHYDRAMINE HYDROCHLORIDE 50 MG/ML
50 INJECTION INTRAMUSCULAR; INTRAVENOUS ONCE
Status: CANCELLED | OUTPATIENT
Start: 2021-06-22 | End: 2021-06-22

## 2021-06-21 RX ORDER — HEPARIN SODIUM (PORCINE) LOCK FLUSH IV SOLN 100 UNIT/ML 100 UNIT/ML
500 SOLUTION INTRAVENOUS PRN
Status: CANCELLED | OUTPATIENT
Start: 2021-06-22

## 2021-06-21 RX ORDER — METHYLPREDNISOLONE SODIUM SUCCINATE 125 MG/2ML
125 INJECTION, POWDER, LYOPHILIZED, FOR SOLUTION INTRAMUSCULAR; INTRAVENOUS ONCE
Status: CANCELLED | OUTPATIENT
Start: 2021-06-22 | End: 2021-06-22

## 2021-06-21 RX ORDER — EPINEPHRINE 1 MG/ML
0.3 INJECTION, SOLUTION, CONCENTRATE INTRAVENOUS PRN
Status: CANCELLED | OUTPATIENT
Start: 2021-06-22

## 2021-06-21 RX ORDER — SODIUM CHLORIDE 0.9 % (FLUSH) 0.9 %
5-40 SYRINGE (ML) INJECTION PRN
Status: CANCELLED | OUTPATIENT
Start: 2021-06-22

## 2021-06-21 RX ORDER — SODIUM CHLORIDE 9 MG/ML
25 INJECTION, SOLUTION INTRAVENOUS PRN
Status: CANCELLED | OUTPATIENT
Start: 2021-06-22

## 2021-06-21 NOTE — PROGRESS NOTES
Renal Progress Note    Assessment and Plan:      Diagnosis Orders   1. CKD (chronic kidney disease), stage IV ([facility]     2. Diabetes mellitus due to underlying condition with stage 4 chronic kidney disease, without long-term current use of insulin ([facility]     3. Essential hypertension     4. Anemia of chronic disease       PLAN:  I discussed my thoughts with the patient and family. They understood. Lab result discussed with him. Serum creatinine stable at 3.1 mg/L. Hemoglobin is low however 7.8 g. Iron saturation is low at 14%. Serum potassium low at 3.4 mEq/L. Medications reviewed. He already received  potassium supplement from us. We will repeat potassium level today. We will arrange for Injectafer 750 mg IV piggyback times two 1 week apart. Stool for occult blood. May need referral to the gastroenterologist for possible colonoscopy or EGD. This would however be at the discretion of the primary  care provider.       Patient Active Problem List   Diagnosis    Glaucoma    Atherosclerotic AMRIT (renal artery stenosis), bilateral (HCC)    Neoplasm of uncertain behavior of skin    Colon polyps    Chronic bilateral low back pain without sciatica    Iron deficiency anemia    B12 deficiency    Asymptomatic carotid artery stenosis, bilateral    Fracture of upper end of humerus    Anemia due to stage 4 chronic kidney disease (HCC)    ASHD (arteriosclerotic heart disease)    CKD (chronic kidney disease) stage 4, GFR 15-29 ml/min (HCC)    Dementia ([facility]    DM2 (diabetes mellitus, type 2) ([facility])    Dyslipidemia    Hemorrhoids    Hypertension, essential    Osteoarthritis    CRAVEN (dyspnea on exertion)    Bilateral carotid artery disease (HCC) Left >> Rt    Coronary artery disease involving native coronary artery of native heart without angina pectoris       Subjective:   Chief complaint:  Chief Complaint   Patient presents with    Chronic Kidney Disease     Stage V      HPI:This is a follow up visit for Ms. Thad Stoner who is here today for return appointment. I saw her about 4 weeks ago for chronic kidney disease. Doing well since then with no complaint. No new medications. No chest pain or shortness of breath. No nausea vomiting. No fever chills. No dark stools. No diarrhea. ROS:  Pertinent positives stated above in HPI. All other systems were reviewed and were negative. Medications:     Current Outpatient Medications   Medication Sig Dispense Refill    acetaminophen (TYLENOL) 325 MG tablet Take 650 mg by mouth every 4 hours as needed for Pain or Fever      amLODIPine (NORVASC) 10 MG tablet Take 10 mg by mouth daily      loperamide (IMODIUM) 2 MG capsule Take 2 mg by mouth 2 times daily as needed for Diarrhea      docusate sodium (COLACE) 100 MG capsule Take 100 mg by mouth 2 times daily as needed for Constipation      donepezil (ARICEPT) 5 MG tablet Take 5 mg by mouth nightly      guaiFENesin (ROBITUSSIN) 100 MG/5ML SOLN oral solution Take 100 mg by mouth every 4 hours as needed for Cough      hydrALAZINE (APRESOLINE) 100 MG tablet Take 100 mg by mouth 3 times daily      hydrOXYzine (ATARAX) 25 MG tablet Take 25 mg by mouth every 6 hours as needed for Anxiety      bismuth subsalicylate (PEPTO BISMOL) 262 MG/15ML suspension 30 mLs 30 ML PO prn with each loose BM. **Not to exceed 6 doses in 24hours**      Cholecalciferol (VITAMIN D3) 1.25 MG (75022 UT) CAPS Take 1 capsule by mouth once a week      HYDROcodone-acetaminophen (NORCO) 5-325 MG per tablet 1 tab(s) orally every 12 hours, As Needed for pain      Blood Glucose Monitoring Suppl (ONETOUCH VERIO) w/Device KIT 1 Device by Does not apply route 2 times daily 1 kit 0    blood glucose test strips (ONETOUCH VERIO) strip 1 each by In Vitro route daily As needed.  100 each 3    ONETOUCH DELICA LANCETS FINE MISC 1 box by Does not apply route 2 times daily 1 each 3    ferrous sulfate (FE TABS) 325 (65 Fe) MG EC tablet Take 1 tablet by mouth 2 times daily (with meals) 60 tablet 2    carvedilol (COREG) 25 MG tablet TAKE 1 TABLET BY MOUTH TWICE DAILY  3    NITROSTAT 0.4 MG SL tablet   3    atorvastatin (LIPITOR) 40 MG tablet Take 40 mg by mouth daily.  fish oil-omega-3 fatty acids 1000 MG capsule Take 3 g by mouth daily.  aspirin 81 MG tablet Take 81 mg by mouth daily. No current facility-administered medications for this visit.        Lab Results:    CBC:   Lab Results   Component Value Date    WBC 5.7 04/19/2021    HGB 7.8 (L) 06/14/2021    HCT 25.1 (L) 06/14/2021    MCV 96.9 04/19/2021     04/19/2021     BMP:    Lab Results   Component Value Date     (L) 06/14/2021     (L) 04/19/2021     (L) 03/01/2021    K 3.4 (L) 06/14/2021    K 3.9 04/19/2021    K 4.3 03/01/2021    CL 97 (L) 06/14/2021    CL 97 (L) 04/19/2021    CL 95 03/01/2021    CO2 19 (L) 06/14/2021    CO2 21 (L) 04/19/2021    CO2 21 03/01/2021    BUN 36 (H) 06/14/2021    BUN 39 (H) 04/19/2021    BUN 27 (H) 03/01/2021    CREATININE 3.1 (HH) 06/14/2021    CREATININE 3.0 (HH) 04/19/2021    CREATININE 2.96 (H) 03/01/2021    GLUCOSE 184 (H) 06/14/2021    GLUCOSE 140 (H) 04/19/2021    GLUCOSE 228 (H) 03/01/2021      Hepatic:   Lab Results   Component Value Date    AST 17 04/19/2021    AST 16 03/01/2021    AST 25 04/04/2019    ALT 12 04/19/2021    ALT 13 03/01/2021    ALT 19 04/04/2019    BILITOT 0.4 04/19/2021    BILITOT 0.3 03/01/2021    BILITOT 0.3 04/04/2019    ALKPHOS 75 04/19/2021    ALKPHOS 58 03/01/2021    ALKPHOS 72 04/04/2019     BNP: No results found for: BNP  Lipids:   Lab Results   Component Value Date    CHOL 112 04/19/2021    HDL 55 04/19/2021     INR: No results found for: INR  URINE: No results found for: NAUR, PROTUR  Lab Results   Component Value Date    NITRU Negative 08/16/2017    COLORU Yellow 08/16/2017    PHUR 6.5 08/16/2017    WBCUA  08/16/2017    RBCUA  08/16/2017    BACTERIA Few 08/16/2017    CLARITYU CLOUDY

## 2021-06-22 NOTE — TELEPHONE ENCOUNTER
Scheduling called back and states outpt nursing is closed on 7/05/21. Pt is scheduled on 7/06/21 at 10:30 am for 2nd dose of Injectafer.

## 2021-06-28 ENCOUNTER — HOSPITAL ENCOUNTER (OUTPATIENT)
Dept: NURSING | Age: 81
Discharge: HOME | End: 2021-06-28
Payer: MEDICARE

## 2021-06-28 VITALS
DIASTOLIC BLOOD PRESSURE: 65 MMHG | OXYGEN SATURATION: 98 % | TEMPERATURE: 97.8 F | RESPIRATION RATE: 18 BRPM | HEART RATE: 64 BPM | SYSTOLIC BLOOD PRESSURE: 134 MMHG

## 2021-06-28 DIAGNOSIS — N18.5 CHRONIC KIDNEY DISEASE, STAGE V (HCC): Primary | ICD-10-CM

## 2021-06-28 DIAGNOSIS — D63.1 ANEMIA IN STAGE 5 CHRONIC KIDNEY DISEASE (HCC): ICD-10-CM

## 2021-06-28 DIAGNOSIS — N18.5 ANEMIA IN STAGE 5 CHRONIC KIDNEY DISEASE (HCC): ICD-10-CM

## 2021-06-28 PROCEDURE — 96365 THER/PROPH/DIAG IV INF INIT: CPT

## 2021-06-28 PROCEDURE — 2580000003 HC RX 258: Performed by: INTERNAL MEDICINE

## 2021-06-28 PROCEDURE — 6360000002 HC RX W HCPCS: Performed by: INTERNAL MEDICINE

## 2021-06-28 RX ORDER — SODIUM CHLORIDE 9 MG/ML
25 INJECTION, SOLUTION INTRAVENOUS PRN
Status: CANCELLED | OUTPATIENT
Start: 2021-07-05

## 2021-06-28 RX ORDER — SODIUM CHLORIDE 9 MG/ML
INJECTION, SOLUTION INTRAVENOUS CONTINUOUS
Status: CANCELLED | OUTPATIENT
Start: 2021-07-05

## 2021-06-28 RX ORDER — METHYLPREDNISOLONE SODIUM SUCCINATE 125 MG/2ML
125 INJECTION, POWDER, LYOPHILIZED, FOR SOLUTION INTRAMUSCULAR; INTRAVENOUS ONCE
Status: CANCELLED | OUTPATIENT
Start: 2021-07-05 | End: 2021-07-05

## 2021-06-28 RX ORDER — HEPARIN SODIUM (PORCINE) LOCK FLUSH IV SOLN 100 UNIT/ML 100 UNIT/ML
500 SOLUTION INTRAVENOUS PRN
Status: CANCELLED | OUTPATIENT
Start: 2021-07-05

## 2021-06-28 RX ORDER — DIPHENHYDRAMINE HYDROCHLORIDE 50 MG/ML
50 INJECTION INTRAMUSCULAR; INTRAVENOUS ONCE
Status: CANCELLED | OUTPATIENT
Start: 2021-07-05 | End: 2021-07-05

## 2021-06-28 RX ORDER — SODIUM CHLORIDE 0.9 % (FLUSH) 0.9 %
5-40 SYRINGE (ML) INJECTION PRN
Status: CANCELLED | OUTPATIENT
Start: 2021-07-05

## 2021-06-28 RX ADMIN — FERRIC CARBOXYMALTOSE INJECTION 750 MG: 50 INJECTION, SOLUTION INTRAVENOUS at 10:52

## 2021-06-28 NOTE — PROGRESS NOTES
1031    Pt ambulated into room with walker and with daughter for iron infusion. Pt rights and responsibilities offered to pt. Pt iron explained and pt and daughter questions answered. 1050  Iron started. Pt was offered a drink. No other needs at this time. 1130       Iron infusion complete and pt tolerated well. D/c instructions explained and pt and daughter verbalized understanding.  Pt and daughter ambulated out for d/c.     _m___ Safety:       (Environmental)   [place] to environment   Ensure ID band is correct and in place/ allergy band as needed   Assess for fall risk   Initiate fall precautions as applicable (fall band, side rails, etc.)   Call light within reach   Bed in low position/ wheels locked    _m___ Pain:        Assess pain level and characteristics   Administer analgesics as ordered   Assess effectiveness of pain management and report to MD as needed    _m___ Knowledge Deficit:   Assess baseline knowledge   Provide teaching at level of understanding   Provide teaching via preferred learning method   Evaluate teaching effectiveness    _m___ Hemodynamic/Respiratory Status:       (Pre and Post Procedure Monitoring)   Assess/Monitor vital signs and LOC   Assess Baseline SpO2 prior to any sedation   Obtain weight/height   Assess vital signs/ LOC until patient meets discharge criteria   Monitor procedure site and notify MD of any issues

## 2021-07-06 ENCOUNTER — HOSPITAL ENCOUNTER (OUTPATIENT)
Dept: NURSING | Age: 81
Discharge: HOME | End: 2021-07-06
Payer: MEDICARE

## 2021-07-06 VITALS
TEMPERATURE: 97.8 F | OXYGEN SATURATION: 98 % | HEART RATE: 63 BPM | DIASTOLIC BLOOD PRESSURE: 88 MMHG | RESPIRATION RATE: 18 BRPM | SYSTOLIC BLOOD PRESSURE: 147 MMHG

## 2021-07-06 DIAGNOSIS — D63.1 ANEMIA IN STAGE 5 CHRONIC KIDNEY DISEASE (HCC): ICD-10-CM

## 2021-07-06 DIAGNOSIS — N18.5 CHRONIC KIDNEY DISEASE, STAGE V (HCC): Primary | ICD-10-CM

## 2021-07-06 DIAGNOSIS — N18.5 ANEMIA IN STAGE 5 CHRONIC KIDNEY DISEASE (HCC): ICD-10-CM

## 2021-07-06 PROCEDURE — 6360000002 HC RX W HCPCS: Performed by: INTERNAL MEDICINE

## 2021-07-06 PROCEDURE — 2580000003 HC RX 258: Performed by: INTERNAL MEDICINE

## 2021-07-06 PROCEDURE — 96365 THER/PROPH/DIAG IV INF INIT: CPT

## 2021-07-06 RX ORDER — HEPARIN SODIUM (PORCINE) LOCK FLUSH IV SOLN 100 UNIT/ML 100 UNIT/ML
500 SOLUTION INTRAVENOUS PRN
Status: CANCELLED | OUTPATIENT
Start: 2021-07-06

## 2021-07-06 RX ORDER — SODIUM CHLORIDE 9 MG/ML
25 INJECTION, SOLUTION INTRAVENOUS PRN
Status: CANCELLED | OUTPATIENT
Start: 2021-07-06

## 2021-07-06 RX ORDER — METHYLPREDNISOLONE SODIUM SUCCINATE 125 MG/2ML
125 INJECTION, POWDER, LYOPHILIZED, FOR SOLUTION INTRAMUSCULAR; INTRAVENOUS ONCE
Status: CANCELLED | OUTPATIENT
Start: 2021-07-06 | End: 2021-07-06

## 2021-07-06 RX ORDER — SODIUM CHLORIDE 0.9 % (FLUSH) 0.9 %
5-40 SYRINGE (ML) INJECTION PRN
Status: CANCELLED | OUTPATIENT
Start: 2021-07-06

## 2021-07-06 RX ORDER — DIPHENHYDRAMINE HYDROCHLORIDE 50 MG/ML
50 INJECTION INTRAMUSCULAR; INTRAVENOUS ONCE
Status: CANCELLED | OUTPATIENT
Start: 2021-07-06 | End: 2021-07-06

## 2021-07-06 RX ORDER — SODIUM CHLORIDE 9 MG/ML
INJECTION, SOLUTION INTRAVENOUS CONTINUOUS
Status: CANCELLED | OUTPATIENT
Start: 2021-07-06

## 2021-07-06 RX ADMIN — FERRIC CARBOXYMALTOSE INJECTION 750 MG: 50 INJECTION, SOLUTION INTRAVENOUS at 10:42

## 2021-07-06 ASSESSMENT — PAIN SCALES - GENERAL: PAINLEVEL_OUTOF10: 0

## 2021-07-06 ASSESSMENT — PAIN - FUNCTIONAL ASSESSMENT: PAIN_FUNCTIONAL_ASSESSMENT: 0-10

## 2021-07-30 ENCOUNTER — OFFICE VISIT (OUTPATIENT)
Dept: FAMILY MEDICINE CLINIC | Age: 81
End: 2021-07-30
Payer: MEDICARE

## 2021-07-30 VITALS
TEMPERATURE: 98.4 F | HEIGHT: 64 IN | OXYGEN SATURATION: 98 % | DIASTOLIC BLOOD PRESSURE: 62 MMHG | HEART RATE: 58 BPM | BODY MASS INDEX: 27.14 KG/M2 | WEIGHT: 159 LBS | RESPIRATION RATE: 16 BRPM | SYSTOLIC BLOOD PRESSURE: 124 MMHG

## 2021-07-30 DIAGNOSIS — I70.1 ATHEROSCLEROTIC RAS (RENAL ARTERY STENOSIS), BILATERAL (HCC): ICD-10-CM

## 2021-07-30 DIAGNOSIS — N18.5 ANEMIA DUE TO STAGE 5 CHRONIC KIDNEY DISEASE, NOT ON CHRONIC DIALYSIS (HCC): Chronic | ICD-10-CM

## 2021-07-30 DIAGNOSIS — M54.50 CHRONIC BILATERAL LOW BACK PAIN WITHOUT SCIATICA: ICD-10-CM

## 2021-07-30 DIAGNOSIS — I65.23 ASYMPTOMATIC CAROTID ARTERY STENOSIS, BILATERAL: ICD-10-CM

## 2021-07-30 DIAGNOSIS — E11.21 TYPE 2 DIABETES MELLITUS WITH DIABETIC NEPHROPATHY, WITHOUT LONG-TERM CURRENT USE OF INSULIN (HCC): Primary | ICD-10-CM

## 2021-07-30 DIAGNOSIS — D50.9 IRON DEFICIENCY ANEMIA, UNSPECIFIED IRON DEFICIENCY ANEMIA TYPE: ICD-10-CM

## 2021-07-30 DIAGNOSIS — G89.29 CHRONIC BILATERAL LOW BACK PAIN WITHOUT SCIATICA: ICD-10-CM

## 2021-07-30 DIAGNOSIS — D63.1 ANEMIA DUE TO STAGE 5 CHRONIC KIDNEY DISEASE, NOT ON CHRONIC DIALYSIS (HCC): Chronic | ICD-10-CM

## 2021-07-30 DIAGNOSIS — I25.10 ASHD (ARTERIOSCLEROTIC HEART DISEASE): ICD-10-CM

## 2021-07-30 DIAGNOSIS — N18.5 CKD (CHRONIC KIDNEY DISEASE) STAGE 5, GFR LESS THAN 15 ML/MIN (HCC): Chronic | ICD-10-CM

## 2021-07-30 DIAGNOSIS — I10 HYPERTENSION, ESSENTIAL: ICD-10-CM

## 2021-07-30 DIAGNOSIS — F03.90 DEMENTIA WITHOUT BEHAVIORAL DISTURBANCE, UNSPECIFIED DEMENTIA TYPE: ICD-10-CM

## 2021-07-30 LAB — HBA1C MFR BLD: 5.3 % (ref 4.3–5.7)

## 2021-07-30 PROCEDURE — G8427 DOCREV CUR MEDS BY ELIG CLIN: HCPCS | Performed by: FAMILY MEDICINE

## 2021-07-30 PROCEDURE — 1123F ACP DISCUSS/DSCN MKR DOCD: CPT | Performed by: FAMILY MEDICINE

## 2021-07-30 PROCEDURE — 4040F PNEUMOC VAC/ADMIN/RCVD: CPT | Performed by: FAMILY MEDICINE

## 2021-07-30 PROCEDURE — G8399 PT W/DXA RESULTS DOCUMENT: HCPCS | Performed by: FAMILY MEDICINE

## 2021-07-30 PROCEDURE — G8417 CALC BMI ABV UP PARAM F/U: HCPCS | Performed by: FAMILY MEDICINE

## 2021-07-30 PROCEDURE — 99214 OFFICE O/P EST MOD 30 MIN: CPT | Performed by: FAMILY MEDICINE

## 2021-07-30 PROCEDURE — 1090F PRES/ABSN URINE INCON ASSESS: CPT | Performed by: FAMILY MEDICINE

## 2021-07-30 PROCEDURE — 1036F TOBACCO NON-USER: CPT | Performed by: FAMILY MEDICINE

## 2021-07-30 RX ORDER — OMEPRAZOLE 20 MG/1
20 CAPSULE, DELAYED RELEASE ORAL
Qty: 90 CAPSULE | Refills: 3 | Status: SHIPPED | OUTPATIENT
Start: 2021-07-30 | End: 2021-08-20 | Stop reason: ALTCHOICE

## 2021-07-30 NOTE — PROGRESS NOTES
Chief Complaint   Patient presents with    3 Month Follow-Up     DM& Chrnoic issues as noted below       History obtained from daughter and the patient. SUBJECTIVE:  Albin Harden is a 80 y.o. female that presents today for     -DM2: Was On amaryl, stopped last visit  a1c last  Time was 5.1  a1c today is 5.3  occ lows  Previous a1c's low as well  Was on metformin prior to worsening renal fxn      -Dementia:  Dx 9+ months ago  On aricept 5mg daily  Tolerating well  Not driving      -CAD:  No hx of MI or CHF  On asa, BB and statin  1V CABG 20+ years ago  Needs to EST with local cardio  No SI/HI      -HTN/CKD4/Hyponatremia:  BP <140/90  Stage 4 CKD, unclear etiology, as it progressed rapidly in the last year or so  Seeing nephro in [facility] [place] [facility] [employer]  Records pending  Complicated by anemia  Needs to EST with nephro  Also has some mild, chronic hyponatremia      -Anemia:  Had some ?  Bowel bleeding in DEC 2020  Neg EGD/[place] then other than polyps  No bleeding since  On iron  No bleeding, melena or hematochezia  Iron and anemia worse  nephro doing iron infusions  Discussed having see GI in f/u, she declines  Is ok to take PPI      -Carotid artery stenosis:  Mild  Noted on US 2018  On statin and asa  No stroke like sxs  Sig occlusion, rads recommends CTA but can't d/t renal issues  Has EST with local vascular surgeon, f/u US ordered      -Chronic back pain:  On norco prn  Doesn't use often  Could get 30 at a time  Last was DEC 2020  Doesn't need RF      Age/Gender Health Maintenance    Lipid -   Lab Results   Component Value Date    CHOL 112 04/19/2021    CHOL 119 01/04/2021    CHOL 116 04/04/2019     Lab Results   Component Value Date    TRIG 86 04/19/2021    TRIG 111 01/04/2021    TRIG 122 04/04/2019     Lab Results   Component Value Date    HDL 55 04/19/2021    HDL 62 (H) 01/04/2021    HDL 51 04/04/2019     Lab Results   Component Value Date    LDLCALC 40 04/19/2021    [facility] 35 01/04/2021    LDLCALC 41 04/04/2019  donepezil (ARICEPT) 5 MG tablet Take 5 mg by mouth nightly      hydrALAZINE (APRESOLINE) 100 MG tablet Take 100 mg by mouth 3 times daily      hydrOXYzine (ATARAX) 25 MG tablet Take 25 mg by mouth every 6 hours as needed for Anxiety      bismuth subsalicylate (PEPTO BISMOL) 262 MG/15ML suspension 30 mLs 30 ML PO prn with each loose BM. **Not to exceed 6 doses in 24hours**      Cholecalciferol (VITAMIN D3) 1.25 MG (01959 UT) CAPS Take 1 capsule by mouth once a week      HYDROcodone-acetaminophen (NORCO) 5-325 MG per tablet 1 tab(s) orally every 12 hours, As Needed for pain      Blood Glucose Monitoring Suppl (ONETOUCH VERIO) w/Device KIT 1 Device by Does not apply route 2 times daily 1 kit 0    blood glucose test strips (ONETOUCH VERIO) strip 1 each by In Vitro route daily As needed. 100 each 3    ONETOUCH DELICA LANCETS FINE MISC 1 box by Does not apply route 2 times daily 1 each 3    carvedilol (COREG) 25 MG tablet TAKE 1 TABLET BY MOUTH TWICE DAILY  3    NITROSTAT 0.4 MG SL tablet   3    atorvastatin (LIPITOR) 40 MG tablet Take 40 mg by mouth daily.  aspirin 81 MG tablet Take 81 mg by mouth daily.  ferric carboxymaltose (INJECTAFER) 750 MG/15ML SOLN IV solution Infuse 15 mLs intravenously once for 1 dose 15 mL 0    ferrous sulfate (FE TABS) 325 (65 Fe) MG EC tablet Take 1 tablet by mouth 2 times daily (with meals) 60 tablet 2     No current facility-administered medications for this visit. Orders Placed This Encounter   Medications    omeprazole (PRILOSEC) 20 MG delayed release capsule     Sig: Take 1 capsule by mouth every morning (before breakfast)     Dispense:  90 capsule     Refill:  3       All medications reviewed and reconciled, including OTC and herbal medications. Updated list given to patient.        Patient Active Problem List    Diagnosis Date Noted    Anemia of chronic renal failure 06/21/2021    Chronic kidney disease, stage V ([facility] 06/21/2021    Anemia in stage 5 chronic kidney disease ([address] 06/21/2021    CRAVEN (dyspnea on exertion) 04/21/2021    Bilateral carotid artery disease (HCC) Left >> Rt 04/21/2021    Coronary artery disease involving native coronary artery of native heart without angina pectoris 04/21/2021    Fracture of upper end of humerus 04/06/2021    Anemia due to stage 5 chronic kidney disease, not on chronic dialysis ([address] 04/06/2021    ASHD (arteriosclerotic heart disease)     CKD (chronic kidney disease) stage 5, GFR less than 15 ml/min (HCC)     Dementia ([address]     DM2 (diabetes mellitus, type 2) (HCC)     Dyslipidemia     Hemorrhoids     Hypertension, essential     Osteoarthritis     Asymptomatic carotid artery stenosis, bilateral 04/22/2018    Iron deficiency anemia 04/15/2018    B12 deficiency     Colon polyps 04/03/2017    Chronic bilateral low back pain without sciatica 04/03/2017    Glaucoma     Atherosclerotic AMRIT (renal artery stenosis), bilateral (HCC)      R 80%, L 60%      Neoplasm of uncertain behavior of skin 07/01/2011       Past Medical History:   Diagnosis Date    Anemia due to stage 5 chronic kidney disease, not on chronic dialysis ([address] 04/06/2021    ASHD (arteriosclerotic heart disease)     Asymptomatic carotid artery stenosis, bilateral 04/22/2018    Atherosclerotic AMRIT (renal artery stenosis), bilateral (HCC)     R 80%, L 60%    B12 deficiency     Chronic bilateral low back pain without sciatica 04/03/2017    CKD (chronic kidney disease) stage 5, GFR less than 15 ml/min (HCC)     Dementia ([address]     Diabetic nephropathy ([address]     DM2 (diabetes mellitus, type 2) ([address]     Dyslipidemia     Glaucoma     Hemorrhoids     Hypertension, essential     Osteoarthritis        Past Surgical History:   Procedure Laterality Date    CATARACT REMOVAL      LEFT    COLONOSCOPY  06/23/2010    DR Mario Church    COLONOSCOPY  08/30/2013    DR. SALMON    CORONARY ANGIOPLASTY  2009    CORONARY ARTERY BYPASS GRAFT  2000 1 vessel CABG    DENTAL SURGERY      [place] COLON [place] SCRN NOT  [address]  IND N/A 2017    COLONOSCOPY performed by Jackson [facility]es MD at [facility]    RIGHT BREAST       Allergies   Allergen Reactions    Enalapril      FATIGUE    Other      MYOVIEW DYE-STRESS TEST    Bactrim Itching       Social History     Tobacco Use    Smoking status: Former Smoker     Packs/day: 0.50     Years: 30.00     Pack years: 15.00     Types: Cigarettes     Quit date: 2000     Years since quittin.4    Smokeless tobacco: Never Used    Tobacco comment: quit 17 years ago   Substance Use Topics    Alcohol use: No       Family History   Problem Relation Age of Onset    Heart Disease Mother     Lupus Sister     Diabetes Brother          I have reviewed the patient's past medical history, past surgical history, allergies, medications, social and family history and I have made updates where appropriate.       Review of Systems  Positive responses are highlighted in bold    Constitutional:  Fever, Chills, Night Sweats, Fatigue, Unexpected changes in weight  HENT:  Ear pain, Tinnitus, Nosebleeds, Trouble swallowing, Hearing loss, Sore throat  Cardiovascular:  Chest Pain, Palpitations, Orthopnea, Paroxysmal Nocturnal Dyspnea  Respiratory:  Cough, Wheezing, Shortness of breath, Chest tightness, Apnea  Gastrointestinal:  Nausea, Vomiting, Diarrhea, Constipation, Heartburn, Blood in stool  Genitourinary:  Difficulty or painful urination, Flank pain, Change in frequency, Urgency  Skin:  Color change, Rash, Itching, Wound  Musculoskeletal:  Joint pain, Back pain, Gait problems, Joint swelling, Myalgias  Neurological:  Dizziness, Headaches, Presyncope, Numbness, Seizures, Tremors  Endocrine:  Heat Intolerance, Cold Intolerance, Polydipsia, Polyphagia, Polyuria      PHYSICAL EXAM:  Vitals:    21 1043   BP: 124/62   Pulse: 58   Resp: 16   Temp: 98.4 °F (36.9 °C)   TempSrc: Oral   SpO2: 98%   Weight: 159 lb (72.1 kg)   Height: 5' 3.5\" (1.613 m)     Body mass index is 27.72 kg/m². Pain Score:   2 (back, joints)    VS Reviewed  General Appearance: A&O x 3, No acute distress,well developed and well- nourished  Eyes: pupils equal, round, and reactive to light, extraocular eye movements intact, conjunctivae and eye lids without erythema  ENT: external ear and ear canal clear bilaterally, TMs intact and regular, nose without deformity, nasal mucosa and turbinates normal without polyps, oropharynx normal, dentition is normal for age  Neck: supple and non-tender without mass, no thyromegaly or thyroid nodules, no cervical lymphadenopathy  Pulmonary/Chest: clear to auscultation bilaterally- no wheezes, rales or rhonchi, normal air movement, no respiratory distress or retractions  Cardiovascular: S1 and S2 auscultated w/ RRR. No murmurs, rubs, clicks, or gallops, distal pulses intact. Abdomen: soft, non-tender, non-distended, bowel sounds physiologic,  no rebound or guarding, no masses or hernias noted. Liver and spleen without enlargement. Extremities: no cyanosis, clubbing or edema of the lower extremities. +2 PT/DP bilaterally. Psych: Affect appropriate. Mood normal. Thought process is normal without evidence of depression or psychosis. Good insight and appropriate interaction. Cognition and memory appear to be impaired.   Skin: warm and dry, no rash or erythema  Lymph:  No cervical, auricular or supraclavicular lymph nodes palpated      Results for POC orders placed in visit on 07/30/21   POCT glycosylated hemoglobin (Hb A1C)   Result Value Ref Range    Hemoglobin A1C 5.3 4.3 - 5.7 %       Lab Results   Component Value Date    LABA1C 5.3 07/30/2021    LABA1C 5.1 04/06/2021    LABA1C 4.9 01/18/2021    LABA1C 4.9 01/04/2021    LABA1C 5.8 04/04/2019    LABA1C 6.7 10/09/2018    LABA1C 5.8 05/17/2018    LABA1C 6.4 04/05/2018     Lab Results   Component Value Date    WBC 5.7 04/19/2021    HGB 7.8 (L) 06/14/2021 due to contralateral stenosis. 3. Antegrade flow within the vertebral arteries bilaterally. 4. Consider further characterization of the above findings with CT of the neck.               **This report has been created using voice recognition software.  It may contain minor errors which are inherent in voice recognition technology. **       Final report electronically signed by Dr. Louis Nelson on 4/16/2021 1:33 PM       ASSESSMENT & PLAN  1. Type 2 diabetes mellitus with diabetic nephropathy, without long-term current use of insulin ([facility])    Stable  At goal  con't diet control  Labs UTD    - POCT glycosylated hemoglobin (Hb A1C)    2. Dementia without behavioral disturbance, unspecified dementia type ([facility]    Stable thus far  Agree with continued not driving  con't aricept at 5mg  Discussed inc dose, they'd like to stick with 5 mg for now    3. ASHD (arteriosclerotic heart disease)    Stable  con't tertiary prevention  Labs UTD   F/u cardio    4. Hypertension, essential    Stable  At goal  con't norvasc, coreg, and hydralazine  F/u nephro as planned  Borderline stage 4 to 5 CKD    5. CKD (chronic kidney disease) stage 5, GFR less than 15 ml/min ([facility])      6. Atherosclerotic AMRIT (renal artery stenosis), bilateral ([facility])      7. Anemia due to stage 5 chronic kidney disease, not on chronic dialysis ([facility] [facility]    con't po iron and iron infusions  No s/s bleeding  UTD egd/colo in DEC  Dicussed f/u with them to repeat EGD to r/o changes. She declines  Will add PPI  F/u nephro for labs    8. Iron deficiency anemia, unspecified iron deficiency anemia type    - omeprazole (PRILOSEC) 20 MG delayed release capsule; Take 1 capsule by mouth every morning (before breakfast)  Dispense: 90 capsule; Refill: 3    9. Asymptomatic carotid artery stenosis, bilateral    Stable  con't RF mod  F/u vascular    10.  Chronic bilateral low back pain without sciatica    Stable  con't prn norco  I'm ok to take over when RF needed      DISPOSITION    Return in about 6 months (around 1/30/2022) for Follow-up Diabetes, follow-up on chronic medical conditions, sooner as needed. Guera April released without restrictions. Future Appointments   Date Time Provider Figueroa Tatianna   8/20/2021 10:00 AM Katie Galeano MD N SRPX Heart [facility] [email or web address]   9/27/2021 10:20 AM MD FREDERIC Lawson [facility] [email or web address]   11/1/2021  1:00 PM STR VASCULAR LAB ROOM 2 [facility] VAS LAB [facility] Radiolog   11/8/2021 10:00 AM MD FREDERIC Coffman SRPX CT/CV [facility] AM [email or web address]   1/31/2022 10:20 AM Ivana Duran DO [facility]     PATIENT COUNSELING    Barriers to learning and self management: dementia, daugther present    Discussed use, benefit, and side effects of prescribed medications. Barriers to medication compliance addressed. All patient questions answered. Pt voiced understanding.        Electronically signed by Ivana Duran DO on 7/30/2021 at 12:46 PM

## 2021-08-20 ENCOUNTER — TELEPHONE (OUTPATIENT)
Dept: CARDIOLOGY CLINIC | Age: 81
End: 2021-08-20

## 2021-08-20 ENCOUNTER — OFFICE VISIT (OUTPATIENT)
Dept: CARDIOLOGY CLINIC | Age: 81
End: 2021-08-20
Payer: MEDICARE

## 2021-08-20 VITALS
WEIGHT: 160.2 LBS | HEIGHT: 64 IN | BODY MASS INDEX: 27.35 KG/M2 | DIASTOLIC BLOOD PRESSURE: 48 MMHG | SYSTOLIC BLOOD PRESSURE: 136 MMHG | HEART RATE: 71 BPM

## 2021-08-20 DIAGNOSIS — I65.23 BILATERAL CAROTID ARTERY STENOSIS: ICD-10-CM

## 2021-08-20 DIAGNOSIS — D63.1 ANEMIA OF CHRONIC RENAL FAILURE, STAGE 4 (SEVERE) (HCC): Primary | ICD-10-CM

## 2021-08-20 DIAGNOSIS — N18.4 ANEMIA OF CHRONIC RENAL FAILURE, STAGE 4 (SEVERE) (HCC): Primary | ICD-10-CM

## 2021-08-20 DIAGNOSIS — I10 HYPERTENSION, ESSENTIAL: Chronic | ICD-10-CM

## 2021-08-20 DIAGNOSIS — E78.5 DYSLIPIDEMIA: Chronic | ICD-10-CM

## 2021-08-20 DIAGNOSIS — I25.10 CORONARY ARTERY DISEASE INVOLVING NATIVE CORONARY ARTERY OF NATIVE HEART WITHOUT ANGINA PECTORIS: Primary | ICD-10-CM

## 2021-08-20 PROCEDURE — 4040F PNEUMOC VAC/ADMIN/RCVD: CPT | Performed by: INTERNAL MEDICINE

## 2021-08-20 PROCEDURE — G8399 PT W/DXA RESULTS DOCUMENT: HCPCS | Performed by: INTERNAL MEDICINE

## 2021-08-20 PROCEDURE — 1090F PRES/ABSN URINE INCON ASSESS: CPT | Performed by: INTERNAL MEDICINE

## 2021-08-20 PROCEDURE — G8427 DOCREV CUR MEDS BY ELIG CLIN: HCPCS | Performed by: INTERNAL MEDICINE

## 2021-08-20 PROCEDURE — 99214 OFFICE O/P EST MOD 30 MIN: CPT | Performed by: INTERNAL MEDICINE

## 2021-08-20 PROCEDURE — 1036F TOBACCO NON-USER: CPT | Performed by: INTERNAL MEDICINE

## 2021-08-20 PROCEDURE — G8417 CALC BMI ABV UP PARAM F/U: HCPCS | Performed by: INTERNAL MEDICINE

## 2021-08-20 PROCEDURE — 1123F ACP DISCUSS/DSCN MKR DOCD: CPT | Performed by: INTERNAL MEDICINE

## 2021-08-20 NOTE — TELEPHONE ENCOUNTER
Per my last note and per Dr. Franco Matamoros last note, Dana Infante is managing. Has been getting iron infusions from Dana Infante. I would have Dr. Budd Koyanagi address with him    Thanks!

## 2021-08-20 NOTE — PROGRESS NOTES
"Encounter Date: 12/3/2020    SCRIBE #1 NOTE: IJaymie am scribing for, and in the presence of, Dr. Walton.   SCRIBE #2 NOTE: I, Annmarie Vilchis am scribing for, and in the presence of, Dr. Walton.     History     Chief Complaint   Patient presents with    Transient Ischemic Attack     Son with patient states that he had a stroke last night.  Patient was seen at [facility] and they did a CT which showed a TIA.  MD is Howard.  Patient has had increased fatigue.  Bilater equal , no facial droop     Time seen by provider: 6:46 PM    This is a 89 y.o. male who presents with complaint of dizziness. Per son, patient has had cough and sore throat since being outside in the rain a couple days ago. Patient's son spoke to him on the phone at about 8:30 PM last night and he seemed to be acting at baseline at that time. He began feeling generally weak and dizzy, which he describes as feeling off balance and as if he is "walking on a boat." He has needed assistance walking due to unsteady gait, but is usually able to walk on his own at baseline. He woke up this morning with persistent dizziness and called his son, who states he seemed too weak to speak and activated 911. He was seen at [facility] today and had CT head that showed "minor stroke" as per son. There was difficulty with transferring the patient to another facility, so he left [facility] and came here for further workup. His symptoms have improved, but he is not back to baseline. He denies currently feeling dizzy while lying in bed but states that he is still very wobbly when he walks. Son denies slurred speech or facial asymmetry. PSHx includes pacemaker placement. His cardiologist is Dr. Trevino. This is the extent of the patient's complaints at this time.    The history is provided by the patient and a relative.     Review of patient's allergies indicates:  No Known Allergies  No past medical history on file.  No past " Chief Complaint   Patient presents with    Follow-up     Echo   Originally  patient here for check up to establish care. Pt is at [employer]  From out of town recently    Daughter states has blockage in carotid was seen on scan by PCP    Abnormal carotid oppler      CKD IV and yet to see nephrologist    4 month echo follow up. EKG done 4-21-21. Denied chest pain, palpitation, dizziness or edema  Walk with walker  CRAVEN    Had CABG x1 to LAD  The year 2000    Pt states med list is correct from PCP appt 4/6/21    Ex smoker for over 21 yrs( since 2000)    Hx of chronic anemia    FHX    Her sister had CAD        Patient Active Problem List   Diagnosis    Glaucoma    Atherosclerotic AMRIT (renal artery stenosis), bilateral (HCC)    Neoplasm of uncertain behavior of skin    Colon polyps    Chronic bilateral low back pain without sciatica    Iron deficiency anemia    B12 deficiency    Asymptomatic carotid artery stenosis, bilateral    Fracture of upper end of humerus    Anemia due to stage 5 chronic kidney disease, not on chronic dialysis ([address]    ASHD (arteriosclerotic heart disease)    CKD (chronic kidney disease) stage 5, GFR less than 15 ml/min (HCC)    Dementia ([address]    DM2 (diabetes mellitus, type 2) ([address]    Dyslipidemia    Hemorrhoids    Hypertension, essential    Osteoarthritis    CRAVEN (dyspnea on exertion)    Bilateral carotid artery disease (HCC) Left >> Rt    Coronary artery disease involving native coronary artery of native heart without angina pectoris    Anemia of chronic renal failure    Chronic kidney disease, stage V (HCC)    Anemia in stage 5 chronic kidney disease ([address]       Past Surgical History:   Procedure Laterality Date    CATARACT REMOVAL      LEFT    COLONOSCOPY  06/23/2010    DR Nelly Turk    COLONOSCOPY  08/30/2013    DR. SALMON    CORONARY ANGIOPLASTY  2009    CORONARY ARTERY BYPASS GRAFT  2000    1 vessel CABG    DENTAL SURGERY      MO COLON surgical history on file.  No family history on file.  Social History     Tobacco Use    Smoking status: Not on file   Substance Use Topics    Alcohol use: Not on file    Drug use: Not on file     Review of Systems   Constitutional: Positive for fatigue. Negative for fever.   HENT: Positive for sore throat. Negative for congestion.    Respiratory: Positive for cough. Negative for shortness of breath.    Cardiovascular: Negative for chest pain.   Gastrointestinal: Negative for abdominal pain and nausea.   Genitourinary: Negative for dysuria and flank pain.   Musculoskeletal: Negative for back pain and neck pain.   Skin: Negative for rash.   Neurological: Positive for dizziness. Negative for facial asymmetry, speech difficulty, weakness and numbness.   Hematological: Does not bruise/bleed easily.       Physical Exam     Initial Vitals [12/03/20 1640]   BP Pulse Resp Temp SpO2   (!) 186/84 70 20 97.3 °F (36.3 °C) 100 %      MAP       --         Physical Exam    Nursing note and vitals reviewed.  Constitutional: He appears well-developed and well-nourished. He is not diaphoretic. No distress.   HENT:   Head: Normocephalic and atraumatic.   Eyes: EOM are normal.   No nystagmus.   Neck: Normal range of motion.   Cardiovascular: Normal rate, regular rhythm and normal heart sounds.   Pulmonary/Chest: Breath sounds normal. No respiratory distress. He has no wheezes. He has no rhonchi. He has no rales.   Abdominal: Soft. Bowel sounds are normal. He exhibits no distension. There is no abdominal tenderness. There is no rebound.   Musculoskeletal: Normal range of motion. No tenderness or edema.   Neurological: He is alert and oriented to person, place, and time. He has normal strength. No cranial nerve deficit or sensory deficit. GCS score is 15. GCS eye subscore is 4. GCS verbal subscore is 5. GCS motor subscore is 6.   5/5 strength.  No facial asymmetry.  No dysarthria.  No aphasia.   Skin: Skin is warm and dry. Capillary  refill takes less than 2 seconds.   Psychiatric: He has a normal mood and affect. His behavior is normal.         ED Course   Procedures  Labs Reviewed   CBC W/ AUTO DIFFERENTIAL - Abnormal; Notable for the following components:       Result Value    RBC 3.96 (*)     Hemoglobin 11.6 (*)     Hematocrit 35.8 (*)     All other components within normal limits   COMPREHENSIVE METABOLIC PANEL - Abnormal; Notable for the following components:    Chloride 111 (*)     CO2 22 (*)     BUN 34 (*)     Creatinine 1.8 (*)     Anion Gap 7 (*)     eGFR if  38 (*)     eGFR if non  33 (*)     All other components within normal limits   TROPONIN I - Abnormal; Notable for the following components:    Troponin I 0.036 (*)     All other components within normal limits   POCT GLUCOSE - Abnormal; Notable for the following components:    POCT Glucose 115 (*)     All other components within normal limits   TSH   TROPONIN I   LIPID PANEL   SARS-COV-2 RDRP GENE   POCT GLUCOSE, HAND-HELD DEVICE     EKG Readings: (Independently Interpreted)   1710: rate of 70 bpm. Wide complex ventricular paced rhythm. No other ST or ischemic changes. Left axis deviation.        Imaging Results          CT Head Without Contrast (Final result)  Result time 12/03/20 19:29:58    Final result by Jonny Vitale MD (12/03/20 19:29:58)                 Impression:      No acute intracranial abnormalities identified.      Electronically signed by: Jonny Vitale MD  Date:    12/03/2020  Time:    19:29             Narrative:    EXAMINATION:  CT HEAD WITHOUT CONTRAST    CLINICAL HISTORY:  Dizziness, non-specific;    TECHNIQUE:  Low dose axial images were obtained through the head.  Coronal and sagittal reformations were also performed. Contrast was not administered.    COMPARISON:  None.    FINDINGS:  There is generalized cerebral volume loss with mild chronic microvascular ischemic change.  No evidence of acute/recent major vascular  "distribution cerebral infarction, intraparenchymal hemorrhage, or intra-axial space occupying lesion. The ventricular system is normal in size and configuration with no evidence of hydrocephalus. No effacement of the skull-base cisterns. No abnormal extra-axial fluid collections or blood products. Visualized paranasal sinuses and mastoid air cells are clear. The calvarium shows no significant abnormality.                                 Medical Decision Making:   History:   Old Medical Records: I decided to obtain old medical records.  Old Records Summarized: other records, records from another hospital and records from clinic visits.  Initial Assessment:   6:46PM:  Patient is an 89-year-old male who presents to the emergency department with dizziness.  Patient's symptoms seem to have improved.  He was seen in an outside facility and diagnosed with a TIA."  I do not have access to these records.  He is otherwise neurologically intact.  Will plan for labs, imaging, will continue to follow and reassess.  Independently Interpreted Test(s):   I have ordered and independently interpreted EKG Reading(s) - see prior notes  Clinical Tests:   Lab Tests: Ordered and Reviewed  Radiological Study: Ordered and Reviewed  Medical Tests: Ordered and Reviewed  Other:   I have discussed this case with another health care provider.    8:18 PM - Discussed case with cardiology, Dr. Trevino. Recommends admission under his service and MRI/MRA for the morning.    8:35PM:  I updated the patient and the family regarding his results along with my discussion with Dr. Trevino.  They are agreeable to plan for admission.  All questions answered.            Scribe Attestation:   Scribe #1: I performed the above scribed service and the documentation accurately describes the services I performed. I attest to the accuracy of the note.  Scribe #2: I performed the above scribed service and the documentation accurately describes the services I " HPI  Gastrointestinal ROS: negative  Genito-Urinary ROS: no dysuria, trouble voiding, or hematuria  Musculoskeletal ROS: negative  Neurological ROS: no TIA or stroke symptoms  Dermatological ROS: negative      Blood pressure (!) 136/48, pulse 71, height 5' 3.5\" (1.613 m), weight 160 lb 3.2 oz (72.7 kg), not currently breastfeeding. Physical Examination:    General appearance - alert, well appearing, and in no distress  HEENT- Pink conjunctiva  , Non-icteri sclera,PERRLA  Mental status - alert, oriented to person, place, and time  Neck - supple, no significant adenopathy, no JVD,   Chest - clear to auscultation, no wheezes, rales or rhonchi, symmetric air entry  Heart - normal rate, regular rhythm, normal S1, S2,   rubs, clicks or gallops  ESM best at aortic area 3/6  B/l carotid bruit noted  Abdomen - soft, nontender, nondistended, no masses or organomegaly  ENZO- no CVA or flank tenderness, no suprapubic tenderness  Neurological - alert, oriented, normal speech, no focal findings or movement disorder noted  Musculoskeletal/limbs - no joint tenderness, deformity or swelling   - peripheral pulses normal, no pedal edema, no clubbing or cyanosis  Skin - normal coloration and turgor, no rashes, no suspicious skin lesions noted  Psych- appropriate mood and affect    Lab  No results for input(s): CKTOTAL, CKMB, CKMBINDEX, TROPONINI in the last 72 hours.   CBC:   Lab Results   Component Value Date    WBC 5.7 04/19/2021    RBC 2.86 04/19/2021    HGB 7.8 06/14/2021    HCT 25.1 06/14/2021    MCV 96.9 04/19/2021    MCH 30.4 04/19/2021    MCHC 31.4 04/19/2021    RDW 13.1 03/01/2021     04/19/2021    MPV 8.7 04/19/2021     BMP:    Lab Results   Component Value Date     06/14/2021    K 3.9 06/21/2021    CL 97 06/14/2021    CO2 19 06/14/2021    BUN 36 06/14/2021    LABALBU 4.4 04/19/2021    LABALBU 3.8 12/07/2020    CREATININE 3.1 06/14/2021    CALCIUM 9.1 06/14/2021    GFRAA 18.5 03/01/2021    LABGLOM 14 06/14/2021    GLUCOSE 184 06/14/2021    GLUCOSE 124 12/07/2020     Hepatic Function Panel:    Lab Results   Component Value Date    ALKPHOS 75 04/19/2021    ALT 12 04/19/2021    AST 17 04/19/2021    PROT 7.0 04/19/2021    BILITOT 0.4 04/19/2021    BILIDIR <0.2 03/01/2021    IBILI see below 03/01/2021    LABALBU 4.4 04/19/2021    LABALBU 3.8 12/07/2020     Magnesium:    Lab Results   Component Value Date    MG 2.1 06/21/2021     Warfarin PT/INR:  No components found for: PTPATWAR, PTINRWAR  HgBA1c:    Lab Results   Component Value Date    LABA1C 5.3 07/30/2021    LABA1C 4.9 01/18/2021     FLP:    Lab Results   Component Value Date    TRIG 86 04/19/2021    HDL 55 04/19/2021    HDL 37 06/03/2011    LDLCALC 40 04/19/2021     TSH:    Lab Results   Component Value Date    TSH 1.270 04/19/2021     Carotid doppler  Greater than 70% stenosis but less than near occlusion is noted within the proximal left internal carotid artery. 2. There is suggested stenosis of 50-69% within the proximal right internal carotid artery. However, the peak systolic velocity within the proximal right internal carotid artery could be falsely elevated due to contralateral stenosis. 3. Antegrade flow within the vertebral arteries bilaterally. 4. Consider further characterization of the above findings with CT of the neck.               This report has been created using voice recognition software.  It may contain minor errors which are inherent in voice recognition technology.       Final report electronically signed by Dr. Carolyn Alvarado on 4/16/2021 1:33 PM      Conclusions      Summary   Normal left ventricle size and systolic function. Ejection fraction was   estimated at 60 to 65 %. There were no regional left ventricular wall   motion abnormalities and wall thickness was within normal limits. Doppler parameters were consistent with abnormal left ventricular   relaxation (grade 1 diastolic dysfunction).    The left atrium is Mildly performed. I attest to the accuracy of the note.    Attending Attestation:           Physician Attestation for Scribe:  Physician Attestation Statement for Scribe #1: I, Dr. Walton, reviewed documentation, as scribed by Jaymie Collazo in my presence, and it is both accurate and complete.   Physician Attestation Statement for Scribe #2: I, Dr. Walton, reviewed documentation, as scribed by Annmarie Vilchis in my presence, and it is both accurate and complete. I also acknowledge and confirm the content of the note done by Vickibe #1.                        Clinical Impression:       ICD-10-CM ICD-9-CM   1. Dizziness  R42 780.4                          ED Disposition Condition    Admit                             Elba Walton MD  12/03/20 3150     dilated. Signature      ----------------------------------------------------------------   Electronically signed by Dante Hernandez MD (Interpreting   physician) on 04/27/2021 at 07:06 PM      ekg 4/21/21  Sinus  Rhythm   -RSR(V1) -nondiagnostic.    -Nonspecific ST depression  -Nondiagnostic. ABNORMAL       Assessment'  ESM best at aortic area 3/6  B/l carotid bruit noted      Hx of EGD and polypectomy from duodnum dec 2020   Diagnosis Orders   1. Coronary artery disease involving native coronary artery of native heart without angina pectoris     2. Hypertension, essential     3. Dyslipidemia     4. Bilateral carotid artery stenosis           Plan     Continue the current treatment and with constant vigilance to changes in symptoms and also any potential side effects. Return for care or seek medical attention immediately if symptoms got worse and/or develop new symptoms. The current meds and labs reviewed    Get cardiology report from her cardiologist    Coronary artery disease, seems to be stable. Denies angina or change in breathing pattern  Baseline echo     Hyperlipidemia: on statins, followed periodically. Patient need periodic lipid and liver profile. Hypertension, on medical treatment. Seems to be under good control. Patient is compliant with medical treatment. Carotid artery stenosis B/l- severe left sided on vascular doppler  Considered  CT of the neck for better  evaluation of the degree of the stenosis   But pat had CKD IV/V and risk is quit high  Consider vascular surgery eval for possible CEA of the left side based on the carotid doppler  [facility]  Surgeon has seen the pat and opat declined further intervention and test on that line    CKD IV- to see nephrology    D/w the pat and daughter    Stable and doing well    Appears pale   Severe anemia   Advised to see pcp asap and address   Leamon Skains did not want colonoscopy      Discussed use, benefit, and side effects of prescribed medications.  All patient questions answered. Pt voiced understanding. Instructed to continue current medications, diet and exercise. Continue risk factor modification and medical management. Patient agreed with treatment plan. Follow up as directed.     RTC in 6 months      Sanchez Walton [facility]

## 2021-08-20 NOTE — TELEPHONE ENCOUNTER
PATIENT WAS IN TO SEE DR HUTTON TODAY AND HER SKIN IS VERY PALE. PATIENT'S HGB ON 6-14-21 WAS 7.8. DR HUTTON IS ASKING IF DR Clau Aguilar IS MANAGING THIS PATIENT'S HGB? DR Clau Aguilar PLEASE SEE DR HUTTON'S NOTE FROM TODAY PLEASE.

## 2021-08-23 NOTE — TELEPHONE ENCOUNTER
BOTTOM LINE- I DO NOT TREAT ANEMIA    PLEASE DO THE FOLLOWING  CONTACT DR. Mane Mcwilliams IF HE WOULD LIKE TO ADDRESS THE ANEMIA BASED ON THE INFO FROM PCP    IF  DOES NOT ADDRESS THE ANEMIA,  CALL PAT TO GET TO THE ER TO URGENT ATTENTION

## 2021-08-24 NOTE — TELEPHONE ENCOUNTER
This message was routed to [facility] Clinical pool. I believe this was error. Please route to appropriate staff.

## 2021-08-25 NOTE — TELEPHONE ENCOUNTER
I called and informed daughter of the labs that need to be done. I also told her I would fax the orders to [facility] at [phone] so they can be done soon. Daughter understood. Per [employer] at [facility], stool for occult blood was done. She is going to fax us those results.

## 2021-08-26 ENCOUNTER — TELEPHONE (OUTPATIENT)
Dept: FAMILY MEDICINE CLINIC | Age: 81
End: 2021-08-26

## 2021-08-27 ENCOUNTER — TELEPHONE (OUTPATIENT)
Dept: NEPHROLOGY | Age: 81
End: 2021-08-27

## 2021-08-27 NOTE — TELEPHONE ENCOUNTER
Sherryle Barbara called regarding her mom Penny Merritt. This in regards to pt getting a ua done at [address] and they have not heard back yet of the results for a UTI. [facility] was supposed to get ahold of Dr. Darren Bloom. She just wanted you know.

## 2021-08-31 ENCOUNTER — TELEPHONE (OUTPATIENT)
Dept: FAMILY MEDICINE CLINIC | Age: 81
End: 2021-08-31

## 2021-08-31 ENCOUNTER — HOSPITAL ENCOUNTER (EMERGENCY)
Age: 81
Discharge: HOME | End: 2021-09-01
Payer: MEDICARE

## 2021-08-31 DIAGNOSIS — Z51.89 ENCOUNTER FOR BLOOD TRANSFUSION: ICD-10-CM

## 2021-08-31 DIAGNOSIS — M54.50 CHRONIC BILATERAL LOW BACK PAIN WITHOUT SCIATICA: Primary | ICD-10-CM

## 2021-08-31 DIAGNOSIS — G89.29 CHRONIC BILATERAL LOW BACK PAIN WITHOUT SCIATICA: Primary | ICD-10-CM

## 2021-08-31 DIAGNOSIS — D64.9 ANEMIA, UNSPECIFIED TYPE: ICD-10-CM

## 2021-08-31 DIAGNOSIS — N18.4 STAGE 4 CHRONIC KIDNEY DISEASE (HCC): Primary | ICD-10-CM

## 2021-08-31 LAB
ABO: NORMAL
ALBUMIN SERPL-MCNC: 4.2 G/DL (ref 3.5–5.1)
ALP BLD-CCNC: 86 U/L (ref 38–126)
ALT SERPL-CCNC: 11 U/L (ref 11–66)
ANION GAP SERPL CALCULATED.3IONS-SCNC: 14 MEQ/L (ref 8–16)
ANTIBODY SCREEN: NORMAL
AST SERPL-CCNC: 15 U/L (ref 5–40)
BILIRUB SERPL-MCNC: 0.2 MG/DL (ref 0.3–1.2)
BILIRUBIN DIRECT: < 0.2 MG/DL (ref 0–0.3)
BUN BLDV-MCNC: 37 MG/DL (ref 7–22)
CALCIUM SERPL-MCNC: 9.5 MG/DL (ref 8.5–10.5)
CHLORIDE BLD-SCNC: 101 MEQ/L (ref 98–111)
CO2: 19 MEQ/L (ref 23–33)
CREAT SERPL-MCNC: 3.5 MG/DL (ref 0.4–1.2)
FERRITIN: 267 NG/ML (ref 10–291)
FOLATE: 9.1 NG/ML (ref 4.8–24.2)
GFR SERPL CREATININE-BSD FRML MDRD: 13 ML/MIN/1.73M2
GLUCOSE BLD-MCNC: 150 MG/DL (ref 70–108)
HEMOCCULT STL QL: POSITIVE
IRON SATURATION: 25 % (ref 20–50)
IRON: 67 UG/DL (ref 50–170)
OSMOLALITY CALCULATION: 279.8 MOSMOL/KG (ref 275–300)
POTASSIUM SERPL-SCNC: 3.9 MEQ/L (ref 3.5–5.2)
RH FACTOR: NORMAL
SCAN OF BLOOD SMEAR: NORMAL
SODIUM BLD-SCNC: 134 MEQ/L (ref 135–145)
TOTAL IRON BINDING CAPACITY: 267 UG/DL (ref 171–450)
TOTAL PROTEIN: 6.6 G/DL (ref 6.1–8)
VITAMIN B-12: 269 PG/ML (ref 211–911)

## 2021-08-31 PROCEDURE — 82248 BILIRUBIN DIRECT: CPT

## 2021-08-31 PROCEDURE — 82272 OCCULT BLD FECES 1-3 TESTS: CPT

## 2021-08-31 PROCEDURE — P9016 RBC LEUKOCYTES REDUCED: HCPCS

## 2021-08-31 PROCEDURE — 99283 EMERGENCY DEPT VISIT LOW MDM: CPT

## 2021-08-31 PROCEDURE — 85025 COMPLETE CBC W/AUTO DIFF WBC: CPT

## 2021-08-31 PROCEDURE — 36430 TRANSFUSION BLD/BLD COMPNT: CPT

## 2021-08-31 PROCEDURE — 86850 RBC ANTIBODY SCREEN: CPT

## 2021-08-31 PROCEDURE — 83550 IRON BINDING TEST: CPT

## 2021-08-31 PROCEDURE — 36415 COLL VENOUS BLD VENIPUNCTURE: CPT

## 2021-08-31 PROCEDURE — 86900 BLOOD TYPING SEROLOGIC ABO: CPT

## 2021-08-31 PROCEDURE — 82607 VITAMIN B-12: CPT

## 2021-08-31 PROCEDURE — 86901 BLOOD TYPING SEROLOGIC RH(D): CPT

## 2021-08-31 PROCEDURE — 83540 ASSAY OF IRON: CPT

## 2021-08-31 PROCEDURE — 80053 COMPREHEN METABOLIC PANEL: CPT

## 2021-08-31 PROCEDURE — 82746 ASSAY OF FOLIC ACID SERUM: CPT

## 2021-08-31 PROCEDURE — 86923 COMPATIBILITY TEST ELECTRIC: CPT

## 2021-08-31 PROCEDURE — 82728 ASSAY OF FERRITIN: CPT

## 2021-08-31 RX ORDER — PANTOPRAZOLE SODIUM 40 MG/10ML
40 INJECTION, POWDER, LYOPHILIZED, FOR SOLUTION INTRAVENOUS ONCE
Status: DISCONTINUED | OUTPATIENT
Start: 2021-08-31 | End: 2021-09-01 | Stop reason: HOSPADM

## 2021-08-31 RX ORDER — HYDROCODONE BITARTRATE AND ACETAMINOPHEN 5; 325 MG/1; MG/1
1 TABLET ORAL 2 TIMES DAILY PRN
Qty: 30 TABLET | Refills: 0 | Status: SHIPPED | OUTPATIENT
Start: 2021-08-31 | End: 2021-09-30

## 2021-08-31 RX ORDER — SODIUM CHLORIDE 9 MG/ML
INJECTION, SOLUTION INTRAVENOUS PRN
Status: DISCONTINUED | OUTPATIENT
Start: 2021-08-31 | End: 2021-09-01 | Stop reason: HOSPADM

## 2021-08-31 ASSESSMENT — ENCOUNTER SYMPTOMS
EYE REDNESS: 0
CHEST TIGHTNESS: 0
DIARRHEA: 0
ABDOMINAL PAIN: 0
SHORTNESS OF BREATH: 0
BLOOD IN STOOL: 0
ANAL BLEEDING: 0
EYE DISCHARGE: 0
COUGH: 0
SORE THROAT: 0
NAUSEA: 0
VOMITING: 0

## 2021-08-31 NOTE — TELEPHONE ENCOUNTER
Please update [facility] facility. Thanks! ASSESSMENT & PLAN   Diagnosis Orders   1. Chronic bilateral low back pain without sciatica  HYDROcodone-acetaminophen (NORCO) 5-325 MG per tablet       OAARS reviewed and appropriate. Controlled Substances Monitoring: Periodic Controlled Substance Monitoring: Possible medication side effects, risk of tolerance/dependence & alternative treatments discussed., No signs of potential drug abuse or diversion identified.  Herberth Merino DO)      Future Appointments   Date Time Provider Figueroa Campuzano   9/27/2021 10:20 AM MD FREDERIC Betancur [facility]. [address]   11/1/2021  1:00 PM STR VASCULAR LAB ROOM 2 [facility] VAS LAB [facility] Radiolog   11/8/2021 10:00 AM MD Jessi Jeffers CT/CV [address]   1/31/2022 10:20 AM Herberth Merino DO [address]   2/25/2022 10:30 AM Zuly Lang MD [facility] [address]

## 2021-08-31 NOTE — ED PROVIDER NOTES
[address] \[facility]\"" [address] [address] EMERGENCY DEPT      CHIEF COMPLAINT       Chief Complaint   Patient presents with    Abnormal Lab     hgb 6.3       Nurses Notes reviewed and I agree except as noted in the HPI. HISTORY OF PRESENT ILLNESS    Zaynab Ford is a 80 y.o. female who presents for evaluation of low Hgb. Patient was getting routine labs done for her PCP when she had this incidental finding. Her PCP told her to go to the ER. She has had a transfusion in the past and gets iron transfusions. She denies gross bleeding per rectum/emesis, denies recent illness, N/V/D, weakness, dizziness, LOC, cold intolerance. Patient has history of chronic kidney disease. REVIEW OF SYSTEMS     Review of Systems   Constitutional: Negative for fatigue and fever. HENT: Negative for nosebleeds and sore throat. Eyes: Negative for discharge and redness. Respiratory: Negative for cough, chest tightness and shortness of breath. Cardiovascular: Positive for leg swelling. Negative for chest pain. Gastrointestinal: Negative for abdominal pain, anal bleeding, blood in stool, diarrhea, nausea and vomiting. Endocrine: Negative for cold intolerance. Genitourinary: Negative for hematuria and vaginal bleeding. Musculoskeletal: Negative for arthralgias and myalgias. Skin: Positive for pallor. Negative for rash and wound. Neurological: Negative for dizziness, weakness, light-headedness, numbness and headaches. Hematological: Bruises/bleeds easily. Psychiatric/Behavioral: Negative for confusion.         PAST MEDICAL HISTORY    has a past medical history of Anemia due to stage 5 chronic kidney disease, not on chronic dialysis ([address] ASHD (arteriosclerotic heart disease), Asymptomatic carotid artery stenosis, bilateral, Atherosclerotic AMRIT (renal artery stenosis), bilateral (HCC), B12 deficiency, Chronic bilateral low back pain without sciatica, CKD (chronic kidney disease) stage 5, GFR less than 15 ml/min ([address] Dementia ([address] Diabetic nephropathy ([facility] DM2 (diabetes mellitus, type 2) ([facility] Dyslipidemia, Glaucoma, Hemorrhoids, Hypertension, essential, and Osteoarthritis. SURGICAL HISTORY      has a past surgical history that includes tumor removal (1984); Coronary artery bypass graft (2000); Coronary angioplasty (2009); Cataract removal; Colonoscopy (06/23/2010); Colonoscopy (08/30/2013); Dental surgery; and pr colon ca scrn not hi rsk ind (N/A, 04/14/2017). CURRENT MEDICATIONS       Discharge Medication List as of 9/1/2021  1:42 AM      CONTINUE these medications which have NOT CHANGED    Details   HYDROcodone-acetaminophen (NORCO) 5-325 MG per tablet Take 1 tablet by mouth 2 times daily as needed for Pain for up to 30 days. , Disp-30 tablet, R-0Normal      ferric carboxymaltose (INJECTAFER) 750 MG/15ML SOLN IV solution Infuse 15 mLs intravenously once for 1 dose, Disp-15 mL, R-0Normal      acetaminophen (TYLENOL) 325 MG tablet Take 650 mg by mouth every 4 hours as needed for Pain or FeverHistorical Med      amLODIPine (NORVASC) 10 MG tablet Take 10 mg by mouth dailyHistorical Med      loperamide (IMODIUM) 2 MG capsule Take 2 mg by mouth 2 times daily as needed for DiarrheaHistorical Med      docusate sodium (COLACE) 100 MG capsule Take 100 mg by mouth 2 times daily as needed for ConstipationHistorical Med      donepezil (ARICEPT) 5 MG tablet Take 5 mg by mouth nightlyHistorical Med      hydrALAZINE (APRESOLINE) 100 MG tablet Take 50 mg by mouth 3 times daily Historical Med      hydrOXYzine (ATARAX) 25 MG tablet Take 25 mg by mouth every 6 hours as needed for AnxietyHistorical Med      bismuth subsalicylate (PEPTO BISMOL) 262 MG/15ML suspension 30 mLs 30 ML PO prn with each loose BM.  **Not to exceed 6 doses in 24hours**Historical Med      Cholecalciferol (VITAMIN D3) 1.25 MG (02139 UT) CAPS Take 1 capsule by mouth once a weekHistorical Med      ferrous sulfate (FE TABS) 325 (65 Fe) MG EC tablet Take 1 tablet by mouth 2 times daily (with meals), Disp-60 tablet, R-2Normal      carvedilol (COREG) 25 MG tablet TAKE 1 TABLET BY MOUTH TWICE DAILY, R-3      NITROSTAT 0.4 MG SL tablet R-3      atorvastatin (LIPITOR) 40 MG tablet Take 40 mg by mouth daily. aspirin 81 MG tablet Take 81 mg by mouth daily. ALLERGIES     is allergic to enalapril, other, and bactrim. FAMILY HISTORY     She indicated that the status of her mother is unknown. She indicated that the status of her sister is unknown. She indicated that the status of her brother is unknown.   family history includes Diabetes in her brother; Heart Disease in her mother; Lupus in her sister. SOCIAL HISTORY    reports that she quit smoking about 21 years ago. Her smoking use included cigarettes. She has a 15.00 pack-year smoking history. She has never used smokeless tobacco. She reports that she does not drink alcohol and does not use drugs. PHYSICAL EXAM     INITIAL VITALS:  height is 5' 4\" (1.626 m) and weight is 160 lb (72.6 kg). Her oral temperature is 98 °F (36.7 °C). Her blood pressure is 158/56 (abnormal) and her pulse is 64. Her respiration is 19 and oxygen saturation is 97%. Physical Exam  Vitals and nursing note reviewed. Constitutional:       General: She is not in acute distress. Appearance: Normal appearance. She is well-developed and normal weight. She is not ill-appearing, toxic-appearing or diaphoretic. HENT:      Head: Normocephalic and atraumatic. Right Ear: Hearing and external ear normal.      Left Ear: Hearing and external ear normal.      Nose: Nose normal. No rhinorrhea. Mouth/Throat:      Mouth: Mucous membranes are moist.      Pharynx: Oropharynx is clear. Uvula midline. No oropharyngeal exudate. Eyes:      General: Lids are normal. No scleral icterus. Extraocular Movements: Extraocular movements intact. Conjunctiva/sclera: Conjunctivae normal.      Pupils: Pupils are equal, round, and reactive to light.    Neck: Trachea: No tracheal deviation. Cardiovascular:      Rate and Rhythm: Normal rate and regular rhythm. Pulses: Normal pulses. Heart sounds: Normal heart sounds. No murmur heard. Pulmonary:      Effort: Pulmonary effort is normal. No respiratory distress. Breath sounds: Normal breath sounds. No stridor. No decreased breath sounds, wheezing, rhonchi or rales. Abdominal:      General: Abdomen is flat. There is no distension. Palpations: Abdomen is soft. Abdomen is not rigid. Tenderness: There is no abdominal tenderness. Musculoskeletal:         General: No signs of injury. Normal range of motion. Cervical back: Normal range of motion and neck supple. No rigidity. Comments: Movement normal as observed   Lymphadenopathy:      Cervical: No cervical adenopathy. Skin:     General: Skin is warm and dry. Coloration: Skin is pale. Skin is not jaundiced. Findings: No bruising, erythema, lesion or rash. Neurological:      General: No focal deficit present. Mental Status: She is alert and oriented to person, place, and time. Gait: Gait normal.      Comments: No gross deficits observed   Psychiatric:         Mood and Affect: Mood normal.         Speech: Speech normal.         Behavior: Behavior normal.         Thought Content: Thought content normal.         Judgment: Judgment normal.         DIFFERENTIAL DIAGNOSIS:   Including but not limited to: anemia of chronic disease, hemolytic anemia, GI bleed, anemia d/t iron deficiency or other nutritional deficiency    DIAGNOSTIC RESULTS     EKG: All E[facility]'s are [facility] who either signs or Co-signs this chart in the absence of a cardiologist.  None    RADIOLOGY: non-plain film images(s) such as CT,Ultrasound and MRI are read by the radiologist.  Plain radiographic images are visualized and preliminarily interpreted by the emergency physician unless otherwise stated below.   No orders to display       LABS:   Labs Reviewed   CBC WITH AUTO DIFFERENTIAL - Abnormal; Notable for the following components:       Result Value    RBC 1.99 (*)     Hemoglobin 6.7 (*)     Hematocrit 21.2 (*)     .5 (*)     MCH 33.7 (*)     MCHC 31.6 (*)     RDW-SD 50.6 (*)     All other components within normal limits   BASIC METABOLIC PANEL - Abnormal; Notable for the following components:    Sodium 134 (*)     CO2 19 (*)     Glucose 150 (*)     BUN 37 (*)     CREATININE 3.5 (*)     All other components within normal limits   HEPATIC FUNCTION PANEL - Abnormal; Notable for the following components: Total Bilirubin 0.2 (*)     All other components within normal limits   GLOMERULAR FILTRATION RATE, ESTIMATED - Abnormal; Notable for the following components:    Est, Glom Filt Rate 13 (*)     All other components within normal limits   BASIC METABOLIC PANEL - Abnormal; Notable for the following components:    Sodium 134 (*)     CO2 20 (*)     Glucose 120 (*)     BUN 34 (*)     CREATININE 3.1 (*)     All other components within normal limits   CBC WITH AUTO DIFFERENTIAL - Abnormal; Notable for the following components:    RBC 2.60 (*)     Hemoglobin 8.1 (*)     Hematocrit 25.5 (*)     MCHC 31.8 (*)     RDW-CV 16.1 (*)     RDW-SD 57.9 (*)     MPV 8.2 (*)     Lymphocytes Absolute 0.7 (*)     All other components within normal limits   GLOMERULAR FILTRATION RATE, ESTIMATED - Abnormal; Notable for the following components:    Est, Glom Filt Rate 14 (*)     All other components within normal limits   BLOOD OCCULT STOOL SCREEN #1   FERRITIN   IRON   IRON BINDING CAPACITY   VITAMIN B12 & FOLATE   IRON SATURATION   ANION GAP   OSMOLALITY   SCAN OF BLOOD SMEAR   ANION GAP   OSMOLALITY   TYPE AND SCREEN   PREPARE RBC (CROSSMATCH)    Narrative:     [id number]     p. Transfused       EMERGENCY DEPARTMENT COURSE:   Vitals:    Vitals:    08/31/21 2120 08/31/21 2200 08/31/21 2315 08/31/21 2345   BP: (!) 155/45 (!) 159/44 (!) 163/59 (!) 158/56   Pulse: 65 62 61 64   Resp: 15 17 16 19   Temp: 98 °F (36.7 °C)      TempSrc: Oral      SpO2: 99% 99% 98% 97%   Weight:       Height:         Consulted our hospitalist Dr. Fina Quinn who agreed discharge was appropriate with patient to have repeat outpatient labs and follow-up with nephrology. MDM:  The patient was seen and evaluated within the ED today for anemia. On exam, I appreciated pallor but no other significant findings. Old records were reviewed. Within the department, I observed the patient's vital signs to be within acceptable range. Laboratory work was reassuring after transfusion with PRBCs. I observed the patient's condition to modestly improve during the duration of their stay. I have considered gross GI bleed and this patient's presentation is not consistent with such entities and therefore, no further testing was warranted. I discussed this patient with my attending physician, Dr. Osito Lynn, who aided in medical decision making and agreed discharge was appropriate. The patient and daughter were comfortable with the plan of discharge home and to follow up with her PCP. Anticipatory guidance was given. The patient was instructed to return to the emergency department for any worsening of their symptoms. Patient was discharged from the emergency department in good condition with all questions answered. See disposition below. I have given the patient strict written and verbal instructions about care at home, follow-up, and signs and symptoms of worsening of condition and they did verbalize understanding. CRITICAL CARE:   None    CONSULTS:  None    PROCEDURES:  None    FINAL IMPRESSION      1. Stage 4 chronic kidney disease ([address]    2. Anemia, unspecified type    3. Encounter for blood transfusion          DISPOSITION/PLAN     1. Stage 4 chronic kidney disease ([address]    2. Anemia, unspecified type    3.  Encounter for blood transfusion        PATIENT REFERRED TO:  Genesis Regalado Anna Yousif  [phone]    Schedule an appointment as soon as possible for a visit       Camilo Cruz MD  [facility], [address]  [facility] [id number]    Schedule an appointment as soon as possible for a visit         DISCHARGE MEDICATIONS:  Discharge Medication List as of 9/1/2021  1:42 AM          (Please note that portions of this note were completed with a voice recognition program.  Efforts were made to edit the dictations but occasionally words are mis-transcribed.)    Kal Franks PA-C 09/01/21 3:59 AM    LEXY Davies PA-C  09/01/21 0406

## 2021-08-31 NOTE — ED TRIAGE NOTES
Pt to er. Pt states was sent over by Dr. Keira Hardy for low hgb of 6.3. Denies any rectal bleeding or vomiting blood. Pt states has had to have transfusion before. Denies all pain.

## 2021-08-31 NOTE — TELEPHONE ENCOUNTER
Received note from [facility] for RF of norco.   Doesn't use often  I need to know which pharmacy it should go to  Let me know, thanks!

## 2021-08-31 NOTE — TELEPHONE ENCOUNTER
Thank you  Can you also update pts daughter about the labs and that we are sending her to the ER? Thanks!

## 2021-08-31 NOTE — TELEPHONE ENCOUNTER
Silvano Bill from [place] calling in critical values drawn  today   Hemoglobin 6.3  Hematocrit 18.7    Dr Choco Michel has been informed and voiced understanding and suggests that the patient be taken to the ER to evaluate   Silvano carrion Rose has been informed and voiced her understanding

## 2021-09-01 ENCOUNTER — TELEPHONE (OUTPATIENT)
Dept: CARDIOLOGY CLINIC | Age: 81
End: 2021-09-01

## 2021-09-01 ENCOUNTER — TELEPHONE (OUTPATIENT)
Dept: FAMILY MEDICINE CLINIC | Age: 81
End: 2021-09-01

## 2021-09-01 ENCOUNTER — TELEPHONE (OUTPATIENT)
Dept: NEPHROLOGY | Age: 81
End: 2021-09-01

## 2021-09-01 VITALS
HEIGHT: 64 IN | BODY MASS INDEX: 27.31 KG/M2 | WEIGHT: 160 LBS | HEART RATE: 64 BPM | RESPIRATION RATE: 19 BRPM | DIASTOLIC BLOOD PRESSURE: 56 MMHG | OXYGEN SATURATION: 97 % | TEMPERATURE: 98 F | SYSTOLIC BLOOD PRESSURE: 158 MMHG

## 2021-09-01 DIAGNOSIS — E53.8 B12 DEFICIENCY: Primary | ICD-10-CM

## 2021-09-01 LAB
ANION GAP SERPL CALCULATED.3IONS-SCNC: 12 MEQ/L (ref 8–16)
ANISOCYTOSIS: PRESENT
BASOPHILIA: ABNORMAL
BASOPHILIC STIPPLING: ABNORMAL
BASOPHILS # BLD: 1.1 %
BASOPHILS # BLD: 1.1 %
BASOPHILS ABSOLUTE: 0.1 THOU/MM3 (ref 0–0.1)
BASOPHILS ABSOLUTE: 0.1 THOU/MM3 (ref 0–0.1)
BUN BLDV-MCNC: 34 MG/DL (ref 7–22)
CALCIUM SERPL-MCNC: 9.4 MG/DL (ref 8.5–10.5)
CHLORIDE BLD-SCNC: 102 MEQ/L (ref 98–111)
CO2: 20 MEQ/L (ref 23–33)
CREAT SERPL-MCNC: 3.1 MG/DL (ref 0.4–1.2)
DACROCYTES: ABNORMAL
DIFFERENTIAL TYPE: ABNORMAL
ELLIPTOCYTES: ABNORMAL
EOSINOPHIL # BLD: 2.5 %
EOSINOPHIL # BLD: 3.1 %
EOSINOPHILS ABSOLUTE: 0.2 THOU/MM3 (ref 0–0.4)
EOSINOPHILS ABSOLUTE: 0.2 THOU/MM3 (ref 0–0.4)
ERYTHROCYTE [DISTWIDTH] IN BLOOD BY AUTOMATED COUNT: 13.2 % (ref 11.5–14.5)
ERYTHROCYTE [DISTWIDTH] IN BLOOD BY AUTOMATED COUNT: 16.1 % (ref 11.5–14.5)
ERYTHROCYTE [DISTWIDTH] IN BLOOD BY AUTOMATED COUNT: 50.6 FL (ref 35–45)
ERYTHROCYTE [DISTWIDTH] IN BLOOD BY AUTOMATED COUNT: 57.9 FL (ref 35–45)
GFR SERPL CREATININE-BSD FRML MDRD: 14 ML/MIN/1.73M2
GLUCOSE BLD-MCNC: 120 MG/DL (ref 70–108)
HCT VFR BLD CALC: 21.2 % (ref 37–47)
HCT VFR BLD CALC: 25.5 % (ref 37–47)
HEMOGLOBIN: 6.7 GM/DL (ref 12–16)
HEMOGLOBIN: 8.1 GM/DL (ref 12–16)
IMMATURE GRANS (ABS): 0.02 THOU/MM3 (ref 0–0.07)
IMMATURE GRANS (ABS): 0.03 THOU/MM3 (ref 0–0.07)
IMMATURE GRANULOCYTES: 0.3 %
IMMATURE GRANULOCYTES: 0.5 %
LYMPHOCYTES # BLD: 10 %
LYMPHOCYTES # BLD: 10 %
LYMPHOCYTES ABSOLUTE: 0.6 THOU/MM3 (ref 1–4.8)
LYMPHOCYTES ABSOLUTE: 0.7 THOU/MM3 (ref 1–4.8)
MACROCYTES: PRESENT
MCH RBC QN AUTO: 31.2 PG (ref 26–33)
MCH RBC QN AUTO: 33.7 PG (ref 26–33)
MCHC RBC AUTO-ENTMCNC: 31.6 GM/DL (ref 32.2–35.5)
MCHC RBC AUTO-ENTMCNC: 31.8 GM/DL (ref 32.2–35.5)
MCV RBC AUTO: 106.5 FL (ref 81–99)
MCV RBC AUTO: 98.1 FL (ref 81–99)
MONOCYTES # BLD: 11.3 %
MONOCYTES # BLD: 12.9 %
MONOCYTES ABSOLUTE: 0.7 THOU/MM3 (ref 0.4–1.3)
MONOCYTES ABSOLUTE: 0.7 THOU/MM3 (ref 0.4–1.3)
NUCLEATED RED BLOOD CELLS: 0 /100 WBC
NUCLEATED RED BLOOD CELLS: 0 /100 WBC
OSMOLALITY CALCULATION: 277 MOSMOL/KG (ref 275–300)
PATHOLOGIST REVIEW: ABNORMAL
PLATELET # BLD: 251 THOU/MM3 (ref 130–400)
PLATELET # BLD: 268 THOU/MM3 (ref 130–400)
PLATELET ESTIMATE: ADEQUATE
PMV BLD AUTO: 8.2 FL (ref 9.4–12.4)
PMV BLD AUTO: 8.4 FL (ref 9.4–12.4)
POIKILOCYTES: ABNORMAL
POTASSIUM SERPL-SCNC: 4.1 MEQ/L (ref 3.5–5.2)
RBC # BLD: 1.99 MILL/MM3 (ref 4.2–5.4)
RBC # BLD: 2.6 MILL/MM3 (ref 4.2–5.4)
SCHISTOCYTES: ABNORMAL
SEG NEUTROPHILS: 72.4 %
SEG NEUTROPHILS: 74.8 %
SEGMENTED NEUTROPHILS ABSOLUTE COUNT: 4 THOU/MM3 (ref 1.8–7.7)
SEGMENTED NEUTROPHILS ABSOLUTE COUNT: 4.9 THOU/MM3 (ref 1.8–7.7)
SODIUM BLD-SCNC: 134 MEQ/L (ref 135–145)
SPHEROCYTES: ABNORMAL
WBC # BLD: 5.5 THOU/MM3 (ref 4.8–10.8)
WBC # BLD: 6.5 THOU/MM3 (ref 4.8–10.8)

## 2021-09-01 PROCEDURE — 36415 COLL VENOUS BLD VENIPUNCTURE: CPT

## 2021-09-01 PROCEDURE — 80048 BASIC METABOLIC PNL TOTAL CA: CPT

## 2021-09-01 PROCEDURE — 85025 COMPLETE CBC W/AUTO DIFF WBC: CPT

## 2021-09-01 RX ORDER — OMEPRAZOLE 20 MG/1
20 CAPSULE, DELAYED RELEASE ORAL EVERY MORNING
COMMUNITY

## 2021-09-01 NOTE — TELEPHONE ENCOUNTER
Dee Tierney states that she spoke with her mother about this on yesterday 3 different times and states that the patient is webb on not wanting to see GI

## 2021-09-01 NOTE — TELEPHONE ENCOUNTER
Pt had blood work drawn which was called to Dr. Ann Espinoza and pt was admitted to [facility].

## 2021-09-01 NOTE — TELEPHONE ENCOUNTER
Iron saturation is now normal.  No need for intravenous iron. Okay to start darbepoetin reginaldo 100 mcg subcu every 2 weeks.

## 2021-09-01 NOTE — TELEPHONE ENCOUNTER
Kelvin Moreno has been informed and voiced her understanding   Per Kelvin Moreno patient will have an evaluation with bridge to set up palliative care on this Friday   Kelvin Moreno is requesting that lab orders be sent to her at [address] [address] as she will be taking the patient to have her labs done    \[facility] [place] [facility]\"" from [place] has been informed and voiced her understanding and states that the patient is taking omeprazole 20 mg po AM  Med list has vanessa updated

## 2021-09-01 NOTE — ED PROVIDER NOTES
ATTENDING NOTE:    I supervised and discussed the history, physical exam and the management of this patient with the PA. I reviewed the PA's note and agree with the documented findings and plan of care.       Electronically verified by MD Mercedes Moffett MD  09/01/21 2006

## 2021-09-01 NOTE — TELEPHONE ENCOUNTER
----- Message from Jeana Montes DO sent at 9/1/2021  6:15 AM EDT -----  Please update Dr. Claudean Ancona office regarding pt being in ED last night for blood transfusion. He's been managing her anemia. Also, I'm concerned she may have some recurrent bleeding from the GI tract, previously she and daughter wanted to hold on GI consult, can you check with pts Kelvin chase, and see if she'd be ok with GI consult now? Let me know, thanks!

## 2021-09-01 NOTE — TELEPHONE ENCOUNTER
[address] faxed critical lab values resulted 8/31/21,also sent to PCP was given verbal order to send patient to ED.

## 2021-09-01 NOTE — TELEPHONE ENCOUNTER
Pt had blood work done on 8/30/21. Her hemoglobin was low. Pt was sent to ER for blood transfusion. Daughter states pt does not want to do colonoscopy or EGD. The daughter is working on getting pt to have GI consult. Please review notes and labs. Do you want pt to get any Aranesp/Injectafer?

## 2021-09-01 NOTE — TELEPHONE ENCOUNTER
Dr Aisha Koehler office is aware that the patient was sent to the ER on 08.31.2021   See tel encounter dated 08.25.221    [facility] for patients daughter Hugh Patel to return a call back

## 2021-09-02 ENCOUNTER — TELEPHONE (OUTPATIENT)
Dept: FAMILY MEDICINE CLINIC | Age: 81
End: 2021-09-02

## 2021-09-02 NOTE — TELEPHONE ENCOUNTER
Daughter wants this scheduled either on Monday or Friday mid to late morning at [facility].

## 2021-09-02 NOTE — TELEPHONE ENCOUNTER
MARÍA Daniel's daughter, Ethan Reese, wanted Dr Pillo Archuleta know that she has an appt with palliative care. States she feels her dementia is worsening and she wants to be sure she is getting the care she needs.

## 2021-09-02 NOTE — TELEPHONE ENCOUNTER
OP nursing called. Received Aranesp order, asked if it was scheduled. Call placed to OP Scheduling. Scheduled patient to receive Aranesp 100 mcg 9/10/21 at 10 am. The second dose will need scheduled for the 24th or the week of.

## 2021-09-07 ENCOUNTER — TELEPHONE (OUTPATIENT)
Dept: NEPHROLOGY | Age: 81
End: 2021-09-07

## 2021-09-07 NOTE — TELEPHONE ENCOUNTER
[facility] wanted to find out if patient was hospice appropriate. She is a stage 5 renal disease as of 9/1/21. Was the family or patient interested in dialysis? Please advise.

## 2021-09-14 ENCOUNTER — TELEPHONE (OUTPATIENT)
Dept: NEPHROLOGY | Age: 81
End: 2021-09-14

## 2021-09-14 NOTE — TELEPHONE ENCOUNTER
Mark Lee from [employer] called to update that patient was a no show for her Aranesp Injection on 9/10. Call placed to Dariel at [facility] to see if patient went to Hospice. Left vm and asked for a return call to the office.

## 2021-09-14 NOTE — TELEPHONE ENCOUNTER
Dariel had called and stated she had yet admitted to hospice yet. They have an informational meeting tomorrow. I told her to let us know what the outcome is.

## 2021-09-15 ENCOUNTER — TELEPHONE (OUTPATIENT)
Dept: FAMILY MEDICINE CLINIC | Age: 81
End: 2021-09-15

## 2021-09-15 NOTE — TELEPHONE ENCOUNTER
[facility] called and requested verbal orders for hospice. Dr. Cassidy Bingham had advised them that Gio Nava is in stage 5 renal failure. She declines dialysis.      [address]